# Patient Record
Sex: MALE | Race: BLACK OR AFRICAN AMERICAN | NOT HISPANIC OR LATINO | Employment: OTHER | URBAN - METROPOLITAN AREA
[De-identification: names, ages, dates, MRNs, and addresses within clinical notes are randomized per-mention and may not be internally consistent; named-entity substitution may affect disease eponyms.]

---

## 2023-05-08 ENCOUNTER — LAB VISIT (OUTPATIENT)
Dept: LAB | Facility: HOSPITAL | Age: 73
End: 2023-05-08
Attending: PHYSICAL MEDICINE & REHABILITATION
Payer: OTHER GOVERNMENT

## 2023-05-08 DIAGNOSIS — E11.9 DIABETES MELLITUS WITHOUT COMPLICATION: Primary | ICD-10-CM

## 2023-05-08 LAB
ALBUMIN SERPL-MCNC: 3.9 G/DL (ref 3.4–4.8)
ALBUMIN/GLOB SERPL: 1.1 RATIO (ref 1.1–2)
ALP SERPL-CCNC: 65 UNIT/L (ref 40–150)
ALT SERPL-CCNC: 13 UNIT/L (ref 0–55)
APPEARANCE UR: CLEAR
AST SERPL-CCNC: 20 UNIT/L (ref 5–34)
BACTERIA #/AREA URNS AUTO: NORMAL /HPF
BILIRUB UR QL STRIP.AUTO: NEGATIVE MG/DL
BILIRUBIN DIRECT+TOT PNL SERPL-MCNC: 0.8 MG/DL
BUN SERPL-MCNC: 14 MG/DL (ref 8.4–25.7)
CALCIUM SERPL-MCNC: 9.7 MG/DL (ref 8.8–10)
CHLORIDE SERPL-SCNC: 107 MMOL/L (ref 98–107)
CO2 SERPL-SCNC: 28 MMOL/L (ref 23–31)
COLOR UR AUTO: YELLOW
CREAT SERPL-MCNC: 1.31 MG/DL (ref 0.73–1.18)
GFR SERPLBLD CREATININE-BSD FMLA CKD-EPI: 57 MLS/MIN/1.73/M2
GLOBULIN SER-MCNC: 3.6 GM/DL (ref 2.4–3.5)
GLUCOSE SERPL-MCNC: 78 MG/DL (ref 82–115)
GLUCOSE UR QL STRIP.AUTO: NEGATIVE MG/DL
KETONES UR QL STRIP.AUTO: ABNORMAL MG/DL
LEUKOCYTE ESTERASE UR QL STRIP.AUTO: ABNORMAL UNIT/L
NITRITE UR QL STRIP.AUTO: NEGATIVE
PH UR STRIP.AUTO: 6.5 [PH]
POTASSIUM SERPL-SCNC: 4.3 MMOL/L (ref 3.5–5.1)
PROT SERPL-MCNC: 7.5 GM/DL (ref 5.8–7.6)
PROT UR QL STRIP.AUTO: ABNORMAL MG/DL
RBC #/AREA URNS AUTO: <5 /HPF
RBC UR QL AUTO: NEGATIVE UNIT/L
SODIUM SERPL-SCNC: 142 MMOL/L (ref 136–145)
SP GR UR STRIP.AUTO: 1.02 (ref 1–1.03)
SQUAMOUS #/AREA URNS AUTO: <5 /HPF
UROBILINOGEN UR STRIP-ACNC: 1 MG/DL
WBC #/AREA URNS AUTO: <5 /HPF

## 2023-05-08 PROCEDURE — 81001 URINALYSIS AUTO W/SCOPE: CPT

## 2023-05-08 PROCEDURE — 80053 COMPREHEN METABOLIC PANEL: CPT

## 2023-05-08 PROCEDURE — 36415 COLL VENOUS BLD VENIPUNCTURE: CPT

## 2024-05-09 PROBLEM — E87.70 VOLUME OVERLOAD: Status: ACTIVE | Noted: 2024-05-09

## 2024-05-09 PROBLEM — J84.10 PULMONARY FIBROSIS: Status: ACTIVE | Noted: 2024-05-09

## 2024-05-09 PROBLEM — I50.20 HEART FAILURE WITH REDUCED EJECTION FRACTION: Status: ACTIVE | Noted: 2024-05-09

## 2024-05-20 ENCOUNTER — HOSPITAL ENCOUNTER (OUTPATIENT)
Dept: RADIOLOGY | Facility: HOSPITAL | Age: 74
Discharge: HOME | End: 2024-05-20
Attending: INTERNAL MEDICINE
Payer: MEDICARE

## 2024-05-20 DIAGNOSIS — J84.10 PULMONARY FIBROSIS: ICD-10-CM

## 2024-05-20 PROCEDURE — 71250 CT THORAX DX C-: CPT | Mod: TC

## 2024-07-03 DIAGNOSIS — I35.0 AORTIC STENOSIS: Primary | ICD-10-CM

## 2024-07-05 ENCOUNTER — HOSPITAL ENCOUNTER (OUTPATIENT)
Dept: RADIOLOGY | Facility: HOSPITAL | Age: 74
Discharge: HOME | End: 2024-07-05
Attending: INTERNAL MEDICINE
Payer: OTHER GOVERNMENT

## 2024-07-05 DIAGNOSIS — I35.0 AORTIC STENOSIS: ICD-10-CM

## 2024-07-08 ENCOUNTER — HOSPITAL ENCOUNTER (OUTPATIENT)
Dept: RADIOLOGY | Facility: HOSPITAL | Age: 74
Discharge: HOME | End: 2024-07-08
Attending: INTERNAL MEDICINE
Payer: MEDICARE

## 2024-07-08 LAB
CREAT SERPL-MCNC: 1.7 MG/DL (ref 0.5–1.4)
SAMPLE: ABNORMAL

## 2024-07-08 PROCEDURE — 74174 CTA ABD&PLVS W/CONTRAST: CPT | Mod: TC

## 2024-07-08 PROCEDURE — 71275 CT ANGIOGRAPHY CHEST: CPT | Mod: TC

## 2024-07-08 PROCEDURE — 25500020 PHARM REV CODE 255: Performed by: INTERNAL MEDICINE

## 2024-07-08 RX ADMIN — IOHEXOL 100 ML: 350 INJECTION, SOLUTION INTRAVENOUS at 10:07

## 2024-07-10 DIAGNOSIS — I35.1 AORTIC VALVE REGURGITATION: Primary | ICD-10-CM

## 2024-07-16 ENCOUNTER — HOSPITAL ENCOUNTER (OUTPATIENT)
Dept: RADIOLOGY | Facility: HOSPITAL | Age: 74
Discharge: HOME | End: 2024-07-16
Attending: INTERNAL MEDICINE
Payer: OTHER GOVERNMENT

## 2024-07-16 DIAGNOSIS — I35.0 AORTIC STENOSIS: ICD-10-CM

## 2024-07-16 LAB
CREAT SERPL-MCNC: 1.6 MG/DL (ref 0.5–1.4)
SAMPLE: ABNORMAL

## 2024-07-16 PROCEDURE — 25500020 PHARM REV CODE 255: Performed by: INTERNAL MEDICINE

## 2024-07-16 PROCEDURE — 71275 CT ANGIOGRAPHY CHEST: CPT | Mod: TC

## 2024-07-16 RX ADMIN — IOHEXOL 100 ML: 350 INJECTION, SOLUTION INTRAVENOUS at 09:07

## 2024-07-17 ENCOUNTER — HOSPITAL ENCOUNTER (INPATIENT)
Facility: HOSPITAL | Age: 74
LOS: 8 days | Discharge: HOME | DRG: 266 | End: 2024-07-25
Attending: EMERGENCY MEDICINE | Admitting: INTERNAL MEDICINE
Payer: OTHER GOVERNMENT

## 2024-07-17 DIAGNOSIS — R07.9 CHEST PAIN: ICD-10-CM

## 2024-07-17 DIAGNOSIS — R06.02 SHORTNESS OF BREATH: ICD-10-CM

## 2024-07-17 DIAGNOSIS — I48.92 ATRIAL FLUTTER: ICD-10-CM

## 2024-07-17 DIAGNOSIS — I21.4 NSTEMI (NON-ST ELEVATION MYOCARDIAL INFARCTION): ICD-10-CM

## 2024-07-17 DIAGNOSIS — I35.0 AORTIC VALVE STENOSIS, ETIOLOGY OF CARDIAC VALVE DISEASE UNSPECIFIED: ICD-10-CM

## 2024-07-17 DIAGNOSIS — I48.91 A-FIB: ICD-10-CM

## 2024-07-17 DIAGNOSIS — I48.91 ATRIAL FIBRILLATION WITH RVR: ICD-10-CM

## 2024-07-17 DIAGNOSIS — I35.0 AORTIC STENOSIS: ICD-10-CM

## 2024-07-17 DIAGNOSIS — Z95.2 S/P TAVR (TRANSCATHETER AORTIC VALVE REPLACEMENT): Primary | ICD-10-CM

## 2024-07-17 DIAGNOSIS — I49.3 PVC (PREMATURE VENTRICULAR CONTRACTION): ICD-10-CM

## 2024-07-17 LAB
ALBUMIN SERPL-MCNC: 3.4 G/DL (ref 3.4–4.8)
ALBUMIN/GLOB SERPL: 0.9 RATIO (ref 1.1–2)
ALP SERPL-CCNC: 66 UNIT/L (ref 40–150)
ALT SERPL-CCNC: 22 UNIT/L (ref 0–55)
ANION GAP SERPL CALC-SCNC: 12 MEQ/L
APTT PPP: 27.6 SECONDS (ref 23.2–33.7)
AST SERPL-CCNC: 24 UNIT/L (ref 5–34)
BASOPHILS # BLD AUTO: 0.07 X10(3)/MCL
BASOPHILS NFR BLD AUTO: 0.6 %
BILIRUB SERPL-MCNC: 0.5 MG/DL
BNP BLD-MCNC: 601.9 PG/ML
BUN SERPL-MCNC: 20.3 MG/DL (ref 8.4–25.7)
CALCIUM SERPL-MCNC: 9.2 MG/DL (ref 8.8–10)
CHLORIDE SERPL-SCNC: 105 MMOL/L (ref 98–107)
CO2 SERPL-SCNC: 21 MMOL/L (ref 23–31)
CREAT SERPL-MCNC: 1.59 MG/DL (ref 0.73–1.18)
CREAT/UREA NIT SERPL: 13
EOSINOPHIL # BLD AUTO: 0.15 X10(3)/MCL (ref 0–0.9)
EOSINOPHIL NFR BLD AUTO: 1.2 %
ERYTHROCYTE [DISTWIDTH] IN BLOOD BY AUTOMATED COUNT: 15.9 % (ref 11.5–17)
GFR SERPLBLD CREATININE-BSD FMLA CKD-EPI: 45 ML/MIN/1.73/M2
GLOBULIN SER-MCNC: 4 GM/DL (ref 2.4–3.5)
GLUCOSE SERPL-MCNC: 99 MG/DL (ref 82–115)
HCT VFR BLD AUTO: 51.8 % (ref 42–52)
HGB BLD-MCNC: 15.7 G/DL (ref 14–18)
IMM GRANULOCYTES # BLD AUTO: 0.1 X10(3)/MCL (ref 0–0.04)
IMM GRANULOCYTES NFR BLD AUTO: 0.8 %
INR PPP: 1
LYMPHOCYTES # BLD AUTO: 2.56 X10(3)/MCL (ref 0.6–4.6)
LYMPHOCYTES NFR BLD AUTO: 21.2 %
MAGNESIUM SERPL-MCNC: 2.1 MG/DL (ref 1.6–2.6)
MCH RBC QN AUTO: 26 PG (ref 27–31)
MCHC RBC AUTO-ENTMCNC: 30.3 G/DL (ref 33–36)
MCV RBC AUTO: 85.6 FL (ref 80–94)
MONOCYTES # BLD AUTO: 1.46 X10(3)/MCL (ref 0.1–1.3)
MONOCYTES NFR BLD AUTO: 12.1 %
NEUTROPHILS # BLD AUTO: 7.72 X10(3)/MCL (ref 2.1–9.2)
NEUTROPHILS NFR BLD AUTO: 64.1 %
NRBC BLD AUTO-RTO: 0 %
PLATELET # BLD AUTO: 353 X10(3)/MCL (ref 130–400)
PMV BLD AUTO: 10 FL (ref 7.4–10.4)
POTASSIUM SERPL-SCNC: 4 MMOL/L (ref 3.5–5.1)
PROT SERPL-MCNC: 7.4 GM/DL (ref 5.8–7.6)
PROTHROMBIN TIME: 13.1 SECONDS (ref 12.5–14.5)
RBC # BLD AUTO: 6.05 X10(6)/MCL (ref 4.7–6.1)
SODIUM SERPL-SCNC: 138 MMOL/L (ref 136–145)
TROPONIN I SERPL-MCNC: 1.83 NG/ML (ref 0–0.04)
TROPONIN I SERPL-MCNC: 2.56 NG/ML (ref 0–0.04)
WBC # BLD AUTO: 12.06 X10(3)/MCL (ref 4.5–11.5)

## 2024-07-17 PROCEDURE — 63600175 PHARM REV CODE 636 W HCPCS

## 2024-07-17 PROCEDURE — 93005 ELECTROCARDIOGRAM TRACING: CPT

## 2024-07-17 PROCEDURE — 84484 ASSAY OF TROPONIN QUANT: CPT | Performed by: NURSE PRACTITIONER

## 2024-07-17 PROCEDURE — 85025 COMPLETE CBC W/AUTO DIFF WBC: CPT | Performed by: NURSE PRACTITIONER

## 2024-07-17 PROCEDURE — 63600175 PHARM REV CODE 636 W HCPCS: Performed by: EMERGENCY MEDICINE

## 2024-07-17 PROCEDURE — 85730 THROMBOPLASTIN TIME PARTIAL: CPT | Performed by: NURSE PRACTITIONER

## 2024-07-17 PROCEDURE — 99291 CRITICAL CARE FIRST HOUR: CPT | Mod: 25

## 2024-07-17 PROCEDURE — 25000003 PHARM REV CODE 250

## 2024-07-17 PROCEDURE — 25000003 PHARM REV CODE 250: Performed by: EMERGENCY MEDICINE

## 2024-07-17 PROCEDURE — 80053 COMPREHEN METABOLIC PANEL: CPT | Performed by: NURSE PRACTITIONER

## 2024-07-17 PROCEDURE — 83880 ASSAY OF NATRIURETIC PEPTIDE: CPT | Performed by: NURSE PRACTITIONER

## 2024-07-17 PROCEDURE — 93010 ELECTROCARDIOGRAM REPORT: CPT | Mod: ,,, | Performed by: INTERNAL MEDICINE

## 2024-07-17 PROCEDURE — 84484 ASSAY OF TROPONIN QUANT: CPT | Performed by: EMERGENCY MEDICINE

## 2024-07-17 PROCEDURE — 96374 THER/PROPH/DIAG INJ IV PUSH: CPT

## 2024-07-17 PROCEDURE — 21400001 HC TELEMETRY ROOM

## 2024-07-17 PROCEDURE — 11000001 HC ACUTE MED/SURG PRIVATE ROOM

## 2024-07-17 PROCEDURE — 96372 THER/PROPH/DIAG INJ SC/IM: CPT

## 2024-07-17 PROCEDURE — 85610 PROTHROMBIN TIME: CPT | Performed by: NURSE PRACTITIONER

## 2024-07-17 PROCEDURE — 83735 ASSAY OF MAGNESIUM: CPT | Performed by: EMERGENCY MEDICINE

## 2024-07-17 RX ORDER — DIGOXIN 0.25 MG/ML
500 INJECTION INTRAMUSCULAR; INTRAVENOUS
Status: COMPLETED | OUTPATIENT
Start: 2024-07-17 | End: 2024-07-17

## 2024-07-17 RX ORDER — METOPROLOL TARTRATE 25 MG/1
25 TABLET, FILM COATED ORAL 2 TIMES DAILY
Status: DISCONTINUED | OUTPATIENT
Start: 2024-07-17 | End: 2024-07-18

## 2024-07-17 RX ORDER — ASPIRIN 81 MG/1
243 TABLET ORAL
Status: COMPLETED | OUTPATIENT
Start: 2024-07-17 | End: 2024-07-17

## 2024-07-17 RX ORDER — SODIUM CHLORIDE 0.9 % (FLUSH) 0.9 %
10 SYRINGE (ML) INJECTION
Status: DISCONTINUED | OUTPATIENT
Start: 2024-07-17 | End: 2024-07-25 | Stop reason: HOSPADM

## 2024-07-17 RX ORDER — METOPROLOL TARTRATE 1 MG/ML
5 INJECTION, SOLUTION INTRAVENOUS EVERY 5 MIN PRN
Status: COMPLETED | OUTPATIENT
Start: 2024-07-17 | End: 2024-07-22

## 2024-07-17 RX ORDER — TALC
6 POWDER (GRAM) TOPICAL NIGHTLY PRN
Status: DISCONTINUED | OUTPATIENT
Start: 2024-07-17 | End: 2024-07-25 | Stop reason: HOSPADM

## 2024-07-17 RX ORDER — ENOXAPARIN SODIUM 100 MG/ML
1 INJECTION SUBCUTANEOUS EVERY 12 HOURS
Status: DISCONTINUED | OUTPATIENT
Start: 2024-07-17 | End: 2024-07-18

## 2024-07-17 RX ADMIN — ENOXAPARIN SODIUM 90 MG: 100 INJECTION SUBCUTANEOUS at 05:07

## 2024-07-17 RX ADMIN — METOPROLOL TARTRATE 5 MG: 1 INJECTION, SOLUTION INTRAVENOUS at 04:07

## 2024-07-17 RX ADMIN — ASPIRIN 243 MG: 81 TABLET, COATED ORAL at 05:07

## 2024-07-17 RX ADMIN — METOPROLOL TARTRATE 25 MG: 25 TABLET, FILM COATED ORAL at 09:07

## 2024-07-17 RX ADMIN — DIGOXIN 500 MCG: 0.25 INJECTION INTRAMUSCULAR; INTRAVENOUS at 06:07

## 2024-07-17 NOTE — Clinical Note
The PA catheter was inserted into the right atrium. Hemodynamics were performed. repositioned to RV, PA, and PCW

## 2024-07-17 NOTE — Clinical Note
The catheter was inserted in the right ventricle. The transvenous pacing lead was inserted into the RV and was placed and capture was confirmed. The pacer was paced at 60 BPM 10 mA 0.5 mV.

## 2024-07-17 NOTE — Clinical Note
Diagnosis: Atrial fibrillation with RVR [[id number]]   Future Attending Provider: KAY SCHULTE [[id number]]   Admit to which facility:: [facility] [[id number]]   Reason for IP Medical Treatment  (Clinical interventions that can only be accomplished in the IP setting? ) :: Rate/rhythm control, CV surgery evaluation   I certify that Inpatient services for greater than or equal to 2 midnights are medically necessary:: Yes   Plans for Post-Acute care--if anticipated (pick the single best option):: A. No post acute care anticipated at this time

## 2024-07-17 NOTE — Clinical Note
The PA catheter was inserted into the pulmonary wedge and secured in place for continuous hemodynamic monitoring. Hemodynamics were performed.

## 2024-07-17 NOTE — Clinical Note
dry, intact, no bleeding and no hematoma. Right groin covered with dermabond. Tegaderm applied to left groin.

## 2024-07-17 NOTE — FIRST PROVIDER EVALUATION
Medical screening examination initiated.  I have conducted a focused provider triage encounter, findings are as follows:    Brief history of present illness:  Patient states SOB and chest pain. States that he was sent to the ED for new onset Afib.     There were no vitals filed for this visit.    Pertinent physical exam:  Awake, alert, ambulatory    Brief workup plan:  Labs, EKG    Preliminary workup initiated; this workup will be continued and followed by the physician or advanced practice provider that is assigned to the patient when roomed.

## 2024-07-17 NOTE — ED NOTES
Spoke with CV surgery NP aware of consult will see in am. Will keep NPO tonight for possible other test.

## 2024-07-17 NOTE — ED PROVIDER NOTES
Encounter Date: 7/17/2024    SCRIBE #1 NOTE: I, Loida Montoya, am scribing for, and in the presence of,  Sasha Gonzalez MD. I have scribed the following portions of the note - the EKG reading. Other sections scribed: HPI, ROS, PE.       History     Chief Complaint   Patient presents with    Palpitations     Sent by [facility] for new onset afib. Had angiogram on 7/10. C/o SOB on exertion. Denies chest pain, only complains of palpitations.      The patient is a 73 year old male with DM Type 2, CKD, HTN, HLD, Dilated cardiomyopathy, and prostate cancer presenting to the ED due palpitations onset the night of 07/16. The patient was sent from [facility] due to new Afib RVR on 07/17. The patient complains of coughing, congestion, and a new onset of fatigue states that he has been feeling more tired than normal. The patient denies chest pain, SOB, and pain. The patient reports to have an angiogram on 07/10. He reports to have surgery on 07/22.     Vascular Surgeon: Rodrigo Peres MD  [facility] Physician: Darin Rahman MD          The history is provided by the patient and medical records. No  was used.   Palpitations   This is a new problem. The current episode started yesterday. Associated symptoms include malaise/fatigue and cough. Pertinent negatives include no chest pain and no shortness of breath.     Review of patient's allergies indicates:   Allergen Reactions    Iodinated contrast media Swelling     Swelling of hands, feet and glands    Atorvastatin      Other reaction(s): Other (See Comments), Unknown    Contrast media     Iodine      Other reaction(s): Other (See Comments)    Lovastatin      Other reaction(s): Other (See Comments), Unknown    Simvastatin      Other reaction(s): Other (See Comments), Unknown     Past Medical History:   Diagnosis Date    Chronic kidney disease, unspecified     Dilated cardiomyopathy     Essential (primary) hypertension     GERD (gastroesophageal reflux disease)      Mixed hyperlipidemia     KATELYN (obstructive sleep apnea)     Prostate cancer     Type 2 diabetes mellitus without complications      No past surgical history on file.  No family history on file.  Social History     Tobacco Use    Smoking status: Never    Smokeless tobacco: Never     Review of Systems   Constitutional:  Positive for fatigue and malaise/fatigue.   HENT:  Positive for congestion.    Respiratory:  Positive for cough. Negative for shortness of breath.    Cardiovascular:  Positive for palpitations. Negative for chest pain.       Physical Exam     Initial Vitals [07/17/24 1540]   BP Pulse Resp Temp SpO2   108/72 (!) 118 20 97.6 °F (36.4 °C) 97 %      MAP       --         Physical Exam    Nursing note and vitals reviewed.  Constitutional: No distress.   HENT:   Head: Normocephalic and atraumatic.   Mouth/Throat: Oropharynx is clear and moist.   Eyes: Conjunctivae are normal.   Neck: Neck supple.   Normal range of motion.  Cardiovascular:  An irregularly irregular rhythm present.   Tachycardia present.         No murmur heard.  Pulmonary/Chest: Breath sounds normal. No respiratory distress. He exhibits no tenderness.   Abdominal: Abdomen is soft. Bowel sounds are normal. He exhibits no distension. There is no abdominal tenderness.   Musculoskeletal:         General: Normal range of motion.      Cervical back: Normal range of motion and neck supple.      Lumbar back: Normal. No tenderness. Normal range of motion.     Neurological: He is alert and oriented to person, place, and time. He has normal strength. No cranial nerve deficit or sensory deficit.   Skin: Skin is warm and dry.   Psychiatric: He has a normal mood and affect. His behavior is normal. Thought content normal.         ED Course   Critical Care    Date/Time: 7/17/2024 4:22 PM    Performed by: Sasha Gonzalez MD  Authorized by: Sasha Gonzalez MD  Direct patient critical care time: 21 minutes  Additional history critical care time: 6  minutes  Ordering / reviewing critical care time: 8 minutes  Documentation critical care time: 4 minutes  Consulting other physicians critical care time: 5 minutes  Total critical care time (exclusive of procedural time) : 44 minutes  Critical care time was exclusive of separately billable procedures and treating other patients.  Critical care was necessary to treat or prevent imminent or life-threatening deterioration of the following conditions: circulatory failure and cardiac failure.  Critical care was time spent personally by me on the following activities: development of treatment plan with patient or surrogate, discussions with consultants, interpretation of cardiac output measurements, evaluation of patient's response to treatment, examination of patient, obtaining history from patient or surrogate, ordering and performing treatments and interventions, ordering and review of laboratory studies, ordering and review of radiographic studies, pulse oximetry, re-evaluation of patient's condition and review of old charts.        Labs Reviewed   COMPREHENSIVE METABOLIC PANEL - Abnormal; Notable for the following components:       Result Value    CO2 21 (*)     Creatinine 1.59 (*)     Globulin 4.0 (*)     Albumin/Globulin Ratio 0.9 (*)     All other components within normal limits   B-TYPE NATRIURETIC PEPTIDE - Abnormal; Notable for the following components:    Natriuretic Peptide 601.9 (*)     All other components within normal limits   TROPONIN I - Abnormal; Notable for the following components:    Troponin-I 2.561 (*)     All other components within normal limits   CBC WITH DIFFERENTIAL - Abnormal; Notable for the following components:    WBC 12.06 (*)     MCH 26.0 (*)     MCHC 30.3 (*)     Mono # 1.46 (*)     IG# 0.10 (*)     All other components within normal limits   APTT - Normal   PROTIME-INR - Normal   MAGNESIUM - Normal   CBC W/ AUTO DIFFERENTIAL    Narrative:     The following orders were created for  panel order CBC Auto Differential.  Procedure                               Abnormality         Status                     ---------                               -----------         ------                     CBC with Differential[[id number]]       Abnormal            Final result                 Please view results for these tests on the individual orders.   TROPONIN I     EKG Readings: (Independently Interpreted)   Initial Reading: No STEMI. Rhythm: Sinus Tachycardia. Heart Rate: 143. Ectopy: No Ectopy. Conduction: Normal. ST Segments: Normal ST Segments. T Waves: Normal. Axis: Left Axis Deviation. Clinical Impression: Atrial Fibrillation with RVR   07/18/2024 @ 1543.          Imaging Results              X-Ray Chest 1 View (Final result)  Result time 07/17/24 16:14:00      Final result by Evin Solano MD (07/17/24 16:14:00)                   Impression:      No acute findings.      Electronically signed by: Evin Solano  Date:    07/17/2024  Time:    16:14               Narrative:    EXAMINATION:  XR CHEST 1 VIEW    CLINICAL HISTORY:  Shortness of breath    COMPARISON:  No priors    FINDINGS:  Frontal view of the chest was obtained. Median sternotomy.  Heart is mildly enlarged.  Lungs are grossly clear.  There is no pneumothorax.                                       Medications   metoprolol injection 5 mg (5 mg Intravenous Given 7/17/24 1622)   enoxaparin injection 90 mg (90 mg Subcutaneous Given 7/17/24 1700)   metoprolol tartrate (LOPRESSOR) tablet 25 mg (has no administration in time range)   sodium chloride 0.9% flush 10 mL (has no administration in time range)   melatonin tablet 6 mg (has no administration in time range)   aspirin EC tablet 243 mg (243 mg Oral Given 7/17/24 1749)   digoxin injection 500 mcg (500 mcg Intravenous Given 7/17/24 1800)     Medical Decision Making  Problems Addressed:  Aortic valve stenosis, etiology of cardiac valve disease unspecified: chronic illness or injury with  exacerbation, progression, or side effects of treatment  Atrial fibrillation with RVR: acute illness or injury that poses a threat to life or bodily functions  NSTEMI (non-ST elevation myocardial infarction): acute illness or injury that poses a threat to life or bodily functions  Shortness of breath: acute illness or injury    Amount and/or Complexity of Data Reviewed  Labs: ordered.  Radiology: ordered.  ECG/medicine tests: ordered and independent interpretation performed.     Details: Initial Reading: No STEMI. Rhythm: Sinus Tachycardia. Heart Rate: 143. Ectopy: No Ectopy. Conduction: Normal. ST Segments: Normal ST Segments. T Waves: Normal. Axis: Left Axis Deviation. Clinical Impression: Atrial Fibrillation with RVR   Peformed at 1543.     Risk  OTC drugs.  Prescription drug management.  Decision regarding hospitalization.        ED assessment:    Mr. Oakley presented for evaluation of new onset AF RVR in setting of recent [facility] w/ intervention, pending AV replacement. + palpitations and some shortness of breath with supine positioning. In no distress.     Differential diagnosis (including but not limited to):   Arrhythmia, electrolyte derangements, CAD, ACS, valvular heart disease    ED management:   EKG w/ AF RVR, reported recent EF 20%. Started on IV metoprolol; however, BP did not tolerate. Given digoxin load w/ improved rate control and BP stabilizing as well.   Labs w/ no significant electrolyte derangements; however, + elevated troponin.   Given full dose aspirin, lovenox per cardiology and admitted to cardiology service.     My independent radiology interpretation:   CXR: no focal infiltrate or effusion    Amount and/or Complexity of Data Reviewed  Independent historian: none   Summary of history:   External data reviewed: notes from clinic visits, prior labs, and prior imaging  Summary of data reviewed:   Clinic note from today reviewed.  Severe aortic stenosis with EF 4 months ago of 45%, now 20%.   History of coronary artery bypass graft.  Status post left heart catheterization 07/10/2024 status post balloon angioplasty and REAGAN left main and lithotripsy.  Presented today with new onset atrial fibrillation with rapid ventricular response.    Risk and benefits of testing: discussed   Labs: ordered and reviewed  Radiology: ordered and independent interpretation performed (see above or ED course)  ECG/medicine tests: ordered and independent interpretation performed (see above or ED course)  Discussion of management or test interpretation with external provider(s): discussed with cardiology consultant   Summary of discussion: as below    Risk  Prescription drug management   Decision regarding hospitalization  Shared decision making     Critical Care  30-74 minutes     Sasha MOYER MD personally performed the history, PE, MDM, and procedures as documented above and agree with the scribe's documentation.                 Scribe Attestation:   Scribe #1: I performed the above scribed service and the documentation accurately describes the services I performed. I attest to the accuracy of the note.    Attending Attestation:           Physician Attestation for Scribe:  Physician Attestation Statement for Scribe #1: Carlos MOYER Kayla O, MD, reviewed documentation, as scribed by Loida Montoya in my presence, and it is both accurate and complete.             ED Course as of 07/17/24 1842   Wed Jul 17, 2024   1554 Discussed with [facility] NP - advised most recent echocardiogram w/ EF 20%.     Will start on metoprolol pushes. Digoxin if unable to rate control.   Supposed to be having TAVR on Monday. [KS]   1804 Discussed with [facility], accepts for admission.  Agrees with digoxin given low BP and response to metoprolol.  Patient has already received Lovenox ordered earlier by [facility].  Requests keep NPO pending Cardiology evaluation in the morning. [KS]      ED Course User Index  [KS] Sasha Gonzalez MD                            Clinical Impression:  Final diagnoses:  [R06.02] Shortness of breath  [I48.91] Atrial fibrillation with RVR  [I21.4] NSTEMI (non-ST elevation myocardial infarction)  [I35.0] Aortic valve stenosis, etiology of cardiac valve disease unspecified          ED Disposition Condition    Admit                 Sasha Gonzalez MD  07/18/24 8836

## 2024-07-17 NOTE — Clinical Note
The catheter was inserted into the aorta. Hemodynamics were performed.  An angiography was performed of the aorta. The angiography was performed via power injection.

## 2024-07-17 NOTE — Clinical Note
The catheter was inserted into the left ventricle. Used stiff short straight glidewire to cross aortic valve, exchanged for 0.035x260 safari wire

## 2024-07-18 LAB
ABORH RETYPE: NORMAL
ALBUMIN SERPL-MCNC: 3.4 G/DL (ref 3.4–4.8)
ALBUMIN/GLOB SERPL: 1 RATIO (ref 1.1–2)
ALP SERPL-CCNC: 71 UNIT/L (ref 40–150)
ALT SERPL-CCNC: 22 UNIT/L (ref 0–55)
ANION GAP SERPL CALC-SCNC: 11 MEQ/L
AST SERPL-CCNC: 22 UNIT/L (ref 5–34)
BASOPHILS # BLD AUTO: 0.08 X10(3)/MCL
BASOPHILS NFR BLD AUTO: 0.8 %
BILIRUB SERPL-MCNC: 1 MG/DL
BUN SERPL-MCNC: 20.9 MG/DL (ref 8.4–25.7)
CALCIUM SERPL-MCNC: 9.6 MG/DL (ref 8.8–10)
CHLORIDE SERPL-SCNC: 104 MMOL/L (ref 98–107)
CO2 SERPL-SCNC: 26 MMOL/L (ref 23–31)
CREAT SERPL-MCNC: 1.51 MG/DL (ref 0.73–1.18)
CREAT/UREA NIT SERPL: 14
EOSINOPHIL # BLD AUTO: 0.22 X10(3)/MCL (ref 0–0.9)
EOSINOPHIL NFR BLD AUTO: 2.3 %
ERYTHROCYTE [DISTWIDTH] IN BLOOD BY AUTOMATED COUNT: 15.9 % (ref 11.5–17)
GFR SERPLBLD CREATININE-BSD FMLA CKD-EPI: 48 ML/MIN/1.73/M2
GLOBULIN SER-MCNC: 3.5 GM/DL (ref 2.4–3.5)
GLUCOSE SERPL-MCNC: 93 MG/DL (ref 82–115)
GROUP & RH: NORMAL
HCT VFR BLD AUTO: 49.9 % (ref 42–52)
HGB BLD-MCNC: 15.2 G/DL (ref 14–18)
IMM GRANULOCYTES # BLD AUTO: 0.07 X10(3)/MCL (ref 0–0.04)
IMM GRANULOCYTES NFR BLD AUTO: 0.7 %
INDIRECT COOMBS: NORMAL
LYMPHOCYTES # BLD AUTO: 1.95 X10(3)/MCL (ref 0.6–4.6)
LYMPHOCYTES NFR BLD AUTO: 20.5 %
MAGNESIUM SERPL-MCNC: 2.3 MG/DL (ref 1.6–2.6)
MAGNESIUM SERPL-MCNC: 2.3 MG/DL (ref 1.6–2.6)
MCH RBC QN AUTO: 26 PG (ref 27–31)
MCHC RBC AUTO-ENTMCNC: 30.5 G/DL (ref 33–36)
MCV RBC AUTO: 85.4 FL (ref 80–94)
MONOCYTES # BLD AUTO: 1.2 X10(3)/MCL (ref 0.1–1.3)
MONOCYTES NFR BLD AUTO: 12.6 %
NEUTROPHILS # BLD AUTO: 6.01 X10(3)/MCL (ref 2.1–9.2)
NEUTROPHILS NFR BLD AUTO: 63.1 %
NRBC BLD AUTO-RTO: 0 %
OHS QRS DURATION: 120 MS
OHS QTC CALCULATION: 574 MS
PLATELET # BLD AUTO: 361 X10(3)/MCL (ref 130–400)
PMV BLD AUTO: 9.9 FL (ref 7.4–10.4)
POTASSIUM SERPL-SCNC: 4.8 MMOL/L (ref 3.5–5.1)
POTASSIUM SERPL-SCNC: 5.2 MMOL/L (ref 3.5–5.1)
PROT SERPL-MCNC: 6.9 GM/DL (ref 5.8–7.6)
RBC # BLD AUTO: 5.84 X10(6)/MCL (ref 4.7–6.1)
SODIUM SERPL-SCNC: 141 MMOL/L (ref 136–145)
SPECIMEN OUTDATE: NORMAL
T4 FREE SERPL-MCNC: 0.86 NG/DL (ref 0.7–1.48)
TROPONIN I SERPL-MCNC: 2.36 NG/ML (ref 0–0.04)
TROPONIN I SERPL-MCNC: 2.75 NG/ML (ref 0–0.04)
TROPONIN I SERPL-MCNC: 3.04 NG/ML (ref 0–0.04)
TSH SERPL-ACNC: 5.93 UIU/ML (ref 0.35–4.94)
WBC # BLD AUTO: 9.53 X10(3)/MCL (ref 4.5–11.5)

## 2024-07-18 PROCEDURE — 93005 ELECTROCARDIOGRAM TRACING: CPT

## 2024-07-18 PROCEDURE — 84132 ASSAY OF SERUM POTASSIUM: CPT | Performed by: NURSE PRACTITIONER

## 2024-07-18 PROCEDURE — 93010 ELECTROCARDIOGRAM REPORT: CPT | Mod: ,,, | Performed by: INTERNAL MEDICINE

## 2024-07-18 PROCEDURE — 25000003 PHARM REV CODE 250: Performed by: NURSE PRACTITIONER

## 2024-07-18 PROCEDURE — 25000003 PHARM REV CODE 250: Performed by: PHYSICIAN ASSISTANT

## 2024-07-18 PROCEDURE — 86901 BLOOD TYPING SEROLOGIC RH(D): CPT | Performed by: NURSE PRACTITIONER

## 2024-07-18 PROCEDURE — 86923 COMPATIBILITY TEST ELECTRIC: CPT | Mod: 91 | Performed by: NURSE PRACTITIONER

## 2024-07-18 PROCEDURE — 36415 COLL VENOUS BLD VENIPUNCTURE: CPT

## 2024-07-18 PROCEDURE — 21400001 HC TELEMETRY ROOM

## 2024-07-18 PROCEDURE — 84484 ASSAY OF TROPONIN QUANT: CPT | Performed by: EMERGENCY MEDICINE

## 2024-07-18 PROCEDURE — 83735 ASSAY OF MAGNESIUM: CPT | Performed by: NURSE PRACTITIONER

## 2024-07-18 PROCEDURE — 83735 ASSAY OF MAGNESIUM: CPT

## 2024-07-18 PROCEDURE — 86900 BLOOD TYPING SEROLOGIC ABO: CPT | Performed by: NURSE PRACTITIONER

## 2024-07-18 PROCEDURE — 63600175 PHARM REV CODE 636 W HCPCS

## 2024-07-18 PROCEDURE — 25000003 PHARM REV CODE 250

## 2024-07-18 PROCEDURE — 84443 ASSAY THYROID STIM HORMONE: CPT | Performed by: NURSE PRACTITIONER

## 2024-07-18 PROCEDURE — 86850 RBC ANTIBODY SCREEN: CPT | Performed by: NURSE PRACTITIONER

## 2024-07-18 PROCEDURE — 84484 ASSAY OF TROPONIN QUANT: CPT | Performed by: NURSE PRACTITIONER

## 2024-07-18 PROCEDURE — 84439 ASSAY OF FREE THYROXINE: CPT | Performed by: NURSE PRACTITIONER

## 2024-07-18 PROCEDURE — 80053 COMPREHEN METABOLIC PANEL: CPT

## 2024-07-18 PROCEDURE — 36415 COLL VENOUS BLD VENIPUNCTURE: CPT | Performed by: NURSE PRACTITIONER

## 2024-07-18 PROCEDURE — 85025 COMPLETE CBC W/AUTO DIFF WBC: CPT

## 2024-07-18 PROCEDURE — 93010 ELECTROCARDIOGRAM REPORT: CPT | Mod: 76,,, | Performed by: INTERNAL MEDICINE

## 2024-07-18 PROCEDURE — 99223 1ST HOSP IP/OBS HIGH 75: CPT | Mod: ,,, | Performed by: PHYSICIAN ASSISTANT

## 2024-07-18 PROCEDURE — 11000001 HC ACUTE MED/SURG PRIVATE ROOM

## 2024-07-18 RX ORDER — GLIPIZIDE 10 MG/1
10 TABLET ORAL DAILY
Status: DISCONTINUED | OUTPATIENT
Start: 2024-07-18 | End: 2024-07-25 | Stop reason: HOSPADM

## 2024-07-18 RX ORDER — HYDROCODONE BITARTRATE AND ACETAMINOPHEN 500; 5 MG/1; MG/1
TABLET ORAL
Status: DISCONTINUED | OUTPATIENT
Start: 2024-07-18 | End: 2024-07-25 | Stop reason: HOSPADM

## 2024-07-18 RX ORDER — FAMOTIDINE 20 MG/1
20 TABLET, FILM COATED ORAL DAILY
Status: DISCONTINUED | OUTPATIENT
Start: 2024-07-18 | End: 2024-07-19

## 2024-07-18 RX ORDER — ATORVASTATIN CALCIUM 40 MG/1
40 TABLET, FILM COATED ORAL DAILY
Status: DISCONTINUED | OUTPATIENT
Start: 2024-07-18 | End: 2024-07-25 | Stop reason: HOSPADM

## 2024-07-18 RX ORDER — AZELASTINE 1 MG/ML
2 SPRAY, METERED NASAL 2 TIMES DAILY
Status: DISCONTINUED | OUTPATIENT
Start: 2024-07-18 | End: 2024-07-25 | Stop reason: HOSPADM

## 2024-07-18 RX ORDER — METOPROLOL TARTRATE 25 MG/1
25 TABLET, FILM COATED ORAL ONCE
Status: DISCONTINUED | OUTPATIENT
Start: 2024-07-18 | End: 2024-07-18

## 2024-07-18 RX ORDER — ALBUTEROL SULFATE 90 UG/1
2 INHALANT RESPIRATORY (INHALATION) EVERY 4 HOURS PRN
Status: DISCONTINUED | OUTPATIENT
Start: 2024-07-18 | End: 2024-07-25 | Stop reason: HOSPADM

## 2024-07-18 RX ORDER — CLOPIDOGREL BISULFATE 75 MG/1
75 TABLET ORAL DAILY
Status: DISCONTINUED | OUTPATIENT
Start: 2024-07-18 | End: 2024-07-25 | Stop reason: HOSPADM

## 2024-07-18 RX ORDER — EZETIMIBE 10 MG/1
10 TABLET ORAL DAILY
Status: DISCONTINUED | OUTPATIENT
Start: 2024-07-18 | End: 2024-07-25 | Stop reason: HOSPADM

## 2024-07-18 RX ORDER — METOPROLOL SUCCINATE 25 MG/1
25 TABLET, EXTENDED RELEASE ORAL DAILY
Status: DISCONTINUED | OUTPATIENT
Start: 2024-07-18 | End: 2024-07-18

## 2024-07-18 RX ORDER — CHOLECALCIFEROL (VITAMIN D3) 25 MCG
1000 TABLET ORAL DAILY
Status: DISCONTINUED | OUTPATIENT
Start: 2024-07-18 | End: 2024-07-25 | Stop reason: HOSPADM

## 2024-07-18 RX ORDER — METOPROLOL TARTRATE 50 MG/1
50 TABLET ORAL 2 TIMES DAILY
Status: DISCONTINUED | OUTPATIENT
Start: 2024-07-18 | End: 2024-07-20

## 2024-07-18 RX ORDER — ASPIRIN 81 MG/1
81 TABLET ORAL DAILY
Status: DISCONTINUED | OUTPATIENT
Start: 2024-07-18 | End: 2024-07-25 | Stop reason: HOSPADM

## 2024-07-18 RX ORDER — FUROSEMIDE 20 MG/1
20 TABLET ORAL DAILY
Status: DISCONTINUED | OUTPATIENT
Start: 2024-07-18 | End: 2024-07-25 | Stop reason: HOSPADM

## 2024-07-18 RX ORDER — GUAIFENESIN 600 MG/1
1200 TABLET, EXTENDED RELEASE ORAL 2 TIMES DAILY
Status: DISCONTINUED | OUTPATIENT
Start: 2024-07-18 | End: 2024-07-25 | Stop reason: HOSPADM

## 2024-07-18 RX ADMIN — EZETIMIBE 10 MG: 10 TABLET ORAL at 10:07

## 2024-07-18 RX ADMIN — Medication 1000 UNITS: at 10:07

## 2024-07-18 RX ADMIN — ENOXAPARIN SODIUM 90 MG: 100 INJECTION SUBCUTANEOUS at 04:07

## 2024-07-18 RX ADMIN — FUROSEMIDE 20 MG: 20 TABLET ORAL at 10:07

## 2024-07-18 RX ADMIN — ASPIRIN 81 MG: 81 TABLET, COATED ORAL at 10:07

## 2024-07-18 RX ADMIN — FAMOTIDINE 20 MG: 20 TABLET, FILM COATED ORAL at 10:07

## 2024-07-18 RX ADMIN — ATORVASTATIN CALCIUM 40 MG: 40 TABLET, FILM COATED ORAL at 10:07

## 2024-07-18 RX ADMIN — GUAIFENESIN 1200 MG: 600 TABLET, EXTENDED RELEASE ORAL at 09:07

## 2024-07-18 RX ADMIN — CLOPIDOGREL BISULFATE 75 MG: 75 TABLET ORAL at 10:07

## 2024-07-18 RX ADMIN — GUAIFENESIN 1200 MG: 600 TABLET, EXTENDED RELEASE ORAL at 10:07

## 2024-07-18 RX ADMIN — ENOXAPARIN SODIUM 90 MG: 100 INJECTION SUBCUTANEOUS at 05:07

## 2024-07-18 RX ADMIN — METOPROLOL TARTRATE 50 MG: 50 TABLET, FILM COATED ORAL at 09:07

## 2024-07-18 RX ADMIN — METOPROLOL TARTRATE 5 MG: 1 INJECTION, SOLUTION INTRAVENOUS at 12:07

## 2024-07-18 RX ADMIN — GLIPIZIDE 10 MG: 10 TABLET ORAL at 10:07

## 2024-07-18 RX ADMIN — METOPROLOL TARTRATE 25 MG: 25 TABLET, FILM COATED ORAL at 07:07

## 2024-07-18 NOTE — H&P (VIEW-ONLY)
"CT SURGERY PROGRESS NOTE  Cole Oakley  73 y.o.  1950    Patients Procedure: * No surgery found *    Subjective  Interval History: 74 yo s/p CABG and subsequent PTCA LM known severe AS EF 45% 4 months ago - now decreased to EF 20% recently seen on [facility] clinic with new onset afib     Review of Systems   Constitutional: Negative.    HENT: Negative.     Respiratory:  Positive for shortness of breath.    Cardiovascular:         AS, CAD    Genitourinary: Negative.    Musculoskeletal: Negative.    Neurological: Negative.        Medication List  Infusions    Scheduled   enoxparin  1 mg/kg Subcutaneous Q12H (treatment, non-standard time)    metoprolol tartrate  25 mg Oral BID       Objective:  Recent Vitals:  Temp:  [97.6 °F (36.4 °C)-98.9 °F (37.2 °C)] 98.9 °F (37.2 °C)  Pulse:  [] 115  Resp:  [12-30] 18  SpO2:  [91 %-100 %] 97 %  BP: ()/(62-90) 121/64    Physical Exam  Constitutional:       Appearance: Normal appearance.   HENT:      Head: Normocephalic.   Eyes:      Extraocular Movements: Extraocular movements intact.      Pupils: Pupils are equal, round, and reactive to light.   Cardiovascular:      Rate and Rhythm: Normal rate. Rhythm irregular.      Heart sounds: Murmur heard.   Pulmonary:      Effort: Pulmonary effort is normal.   Abdominal:      General: Bowel sounds are normal.   Musculoskeletal:         General: Normal range of motion.   Skin:     General: Skin is warm.   Neurological:      General: No focal deficit present.      Mental Status: He is alert.   Psychiatric:         Mood and Affect: Mood normal.         Behavior: Behavior normal.          I/O last 24 hrs:  Intake/Output - Last 3 Shifts         07/16 0700  07/17 0659 07/17 0700 07/18 0659 07/18 0700 07/19 0659    P.O.  0     Total Intake(mL/kg)  0 (0)     Net  0            Urine Occurrence  4 x     Stool Occurrence  1 x             Labs  ABGs: No results for input(s): "PH", "PCO2", "PO2", "HCO3", "POCSATURATED", "BE" in the " last 48 hours.  BMP:   Recent Labs   Lab 07/18/24  0406      K 5.2*      CO2 26   BUN 20.9   CREATININE 1.51*   CALCIUM 9.6   MG 2.30     CBC:   Recent Labs   Lab 07/18/24  0406   WBC 9.53   RBC 5.84   HGB 15.2   HCT 49.9      MCV 85.4   MCH 26.0*   MCHC 30.5*     CMP:   Recent Labs   Lab 07/18/24  0406   CALCIUM 9.6   ALBUMIN 3.4      K 5.2*   CO2 26      BUN 20.9   CREATININE 1.51*   ALKPHOS 71   ALT 22   AST 22   BILITOT 1.0     Coagulation:   Recent Labs   Lab 07/17/24  1637   INR 1.0   APTT 27.6         Imaging:   CT: No results found in the last 24 hours.  CXR: X-Ray Chest 1 View    Result Date: 7/17/2024  No acute findings. Electronically signed by: Evin Solano Date:    07/17/2024 Time:    16:14   ECHO: I have reviewed all results within the past 24 hours and my personal findings are:  will review         ASSESSMENT/PLAN:    Severe AS ZULY 0.5cm2  EF 20%   CAD s/p CABG and PTCAs/ Shockwave  HTN  DM2  Afib   Elevated creat 1.5    Plan- ongoing eval per Dr Peres for TAVR - tentatively scheduled for upcoming Monday         Case and plan of care discussed with MD Gilson Bowling PA-C

## 2024-07-18 NOTE — CONSULTS
"CT SURGERY PROGRESS NOTE  Cole Oakley  73 y.o.  1950    Patients Procedure: * No surgery found *    Subjective  Interval History: 72 yo s/p CABG and subsequent PTCA LM known severe AS EF 45% 4 months ago - now decreased to EF 20% recently seen on [facility] with new onset afib     Review of Systems   Constitutional: Negative.    HENT: Negative.     Respiratory:  Positive for shortness of breath.    Cardiovascular:         AS, CAD    Genitourinary: Negative.    Musculoskeletal: Negative.    Neurological: Negative.        Medication List  Infusions    Scheduled   enoxparin  1 mg/kg Subcutaneous Q12H (treatment, non-standard time)    metoprolol tartrate  25 mg Oral BID       Objective:  Recent Vitals:  Temp:  [97.6 °F (36.4 °C)-98.9 °F (37.2 °C)] 98.9 °F (37.2 °C)  Pulse:  [] 115  Resp:  [12-30] 18  SpO2:  [91 %-100 %] 97 %  BP: ()/(62-90) 121/64    Physical Exam  Constitutional:       Appearance: Normal appearance.   HENT:      Head: Normocephalic.   Eyes:      Extraocular Movements: Extraocular movements intact.      Pupils: Pupils are equal, round, and reactive to light.   Cardiovascular:      Rate and Rhythm: Normal rate. Rhythm irregular.      Heart sounds: Murmur heard.   Pulmonary:      Effort: Pulmonary effort is normal.   Abdominal:      General: Bowel sounds are normal.   Musculoskeletal:         General: Normal range of motion.   Skin:     General: Skin is warm.   Neurological:      General: No focal deficit present.      Mental Status: He is alert.   Psychiatric:         Mood and Affect: Mood normal.         Behavior: Behavior normal.          I/O last 24 hrs:  Intake/Output - Last 3 Shifts         07/16 0700  07/17 0659 07/17 0700 07/18 0659 07/18 0700 07/19 0659    P.O.  0     Total Intake(mL/kg)  0 (0)     Net  0            Urine Occurrence  4 x     Stool Occurrence  1 x             Labs  ABGs: No results for input(s): "PH", "PCO2", "PO2", "HCO3", "POCSATURATED", "BE" in the " last 48 hours.  BMP:   Recent Labs   Lab 07/18/24  0406      K 5.2*      CO2 26   BUN 20.9   CREATININE 1.51*   CALCIUM 9.6   MG 2.30     CBC:   Recent Labs   Lab 07/18/24  0406   WBC 9.53   RBC 5.84   HGB 15.2   HCT 49.9      MCV 85.4   MCH 26.0*   MCHC 30.5*     CMP:   Recent Labs   Lab 07/18/24  0406   CALCIUM 9.6   ALBUMIN 3.4      K 5.2*   CO2 26      BUN 20.9   CREATININE 1.51*   ALKPHOS 71   ALT 22   AST 22   BILITOT 1.0     Coagulation:   Recent Labs   Lab 07/17/24  1637   INR 1.0   APTT 27.6         Imaging:   CT: No results found in the last 24 hours.  CXR: X-Ray Chest 1 View    Result Date: 7/17/2024  No acute findings. Electronically signed by: Evin Solano Date:    07/17/2024 Time:    16:14   ECHO: I have reviewed all results within the past 24 hours and my personal findings are:  will review         ASSESSMENT/PLAN:    Severe AS ZULY 0.5cm2  EF 20%   CAD s/p CABG and PTCAs/ Shockwave  HTN  DM2  Afib   Elevated creat 1.5    Plan- ongoing eval per Dr Peres for TAVR - tentatively scheduled for upcoming Monday         Case and plan of care discussed with MD Gilson Bowling PA-C

## 2024-07-18 NOTE — NURSING
Nurses Note -- 4 Eyes      7/18/2024   7:40 AM      Skin assessed during: Q Shift Change      [x] No Altered Skin Integrity Present    [x]Prevention Measures Documented      [] Yes- Altered Skin Integrity Present or Discovered   [] LDA Added if Not in Epic (Describe Wound)   [] New Altered Skin Integrity was Present on Admit and Documented in LDA   [] Wound Image Taken    Wound Care Consulted? No    Attending Nurse:  Amparo López RN/Staff Member:  Shanon LALA

## 2024-07-18 NOTE — PLAN OF CARE
07/18/24 1005   Discharge Assessment   Assessment Type Discharge Planning Assessment   Confirmed/corrected address, phone number and insurance Yes   Confirmed Demographics Correct on Facesheet   Source of Information patient   When was your last doctors appointment? 07/02/24   Does patient/caregiver understand observation status Yes   Communicated ALEXIS with patient/caregiver Yes   Reason For Admission sob, chest pain, rvr   People in Home spouse   Facility Arrived From: home   Do you expect to return to your current living situation? Yes   Do you have help at home or someone to help you manage your care at home? Yes   Who are your caregiver(s) and their phone number(s)? spouse   Prior to hospitilization cognitive status: Alert/Oriented   Current cognitive status: Alert/Oriented   Walking or Climbing Stairs Difficulty yes   Walking or Climbing Stairs ambulation difficulty, requires equipment   Mobility Management cane prn   Dressing/Bathing Difficulty no   Home Accessibility wheelchair accessible   Equipment Currently Used at Home cane, straight;CPAP;walker, rolling   Readmission within 30 days? No   Patient currently being followed by outpatient case management? No   Do you currently have service(s) that help you manage your care at home? No   Do you take prescription medications? Yes   Do you have any problems affording any of your prescribed medications? No   Is the patient taking medications as prescribed? yes   Who is going to help you get home at discharge? fmly   How do you get to doctors appointments? family or friend will provide   Are you on dialysis? No   Do you take coumadin? No   Discharge Plan A Home   Discharge Plan B Home Health   DME Needed Upon Discharge  none   Discharge Plan discussed with: Patient;Spouse/sig other   Transition of Care Barriers None   Housing Stability   In the last 12 months, was there a time when you were not able to pay the mortgage or rent on time? N   At any time in the  past 12 months, were you homeless or living in a shelter (including now)? N   Transportation Needs   Has the lack of transportation kept you from medical appointments, meetings, work or from getting things needed for daily living? No   Food Insecurity   Within the past 12 months, you worried that your food would run out before you got the money to buy more. Never true   Within the past 12 months, the food you bought just didn't last and you didn't have money to get more. Never true   Utilities   In the past 12 months has the electric, gas, oil, or water company threatened to shut off services in your home? No     Pt and spouse live together. He has cane, CPAP , has not used HH services. Other needs TBD

## 2024-07-18 NOTE — H&P
[facility] - [facility] Medical Telemetry    Cardiology  History and Physical     Patient Name: Cole Oakley  MRN: [id number]  Admission Date: 7/17/2024  Code Status: Full Code   Attending Provider: Isaiah Mckeon MD   Primary Care Physician: Dina Scott MD  Principal Problem:<principal problem not specified>    Patient information was obtained from patient, past medical records, and ER records.     Subjective:     Chief Complaint:  Fatigue; New Onset Afib RVR    HPI:   Patient is a 73 y.o. male known to Dr. St, with a pmh of CAD s/p CABG & PCI, NSTEMI, HFrEF, HTN, Type II DM, and severe AS. He was sent to [facility] ER from Dr. St's office for new onset Afib.  He presented to Dr. St's office on 7.17.24 for a hospital follow up after undergoing a LHC at [facility] for [place]AVR workup and had intravascular lithotripsy using a 4 x 12 shockwave balloon, balloon angioplasty and stenting of the Left Main using a 5 x 12 REAGAN. Upon arrival to the clinic the patient c/o being fatigued and was found to be in Atrial Fibrillation with RVR.  Upon arrival to the ER labs significant for WBC 12.06, Creatinine 1.59, .9, and Troponin 1.834. EKG revealed A Flutter with variable AV block with PVCs.  CXR without acute findings.  The patient was admitted to [facility] for evaluation and treatment of Afib.      PMH:  CAD s/p CABG and recent PCI, Severe Aortic Stenosis, HFrEF, Type II DM, HTN  PSH:  CABG, Lithotripsy & REAGAN to L Main, Prostate sx, Bilateral Hip Arthroplasty, Catartact extraction  Family History: Mother & Father with H/O CAD  Social History: Denies use of tobacco, ETOH, or illicit drugs     Previous Cardiac Diagnostics:   LHC (7.10.24):        CTA CHEST (7.16.24)  There is excellent opacification of the thoracic aorta.  The thoracic aorta is of normal caliber with mild atherosclerotic disease.  There is no aortic dissection.  No large pulmonary embolus.     Heart is mildly enlarged.  There are  aortic valve leaflet calcifications.  There are changes of CABG.     No significantly enlarged thoracic lymph nodes.     There is no pleural effusion.  There are mild chronic changes of the lungs with no new focal consolidation.     There are no acute findings in the imaged upper abdomen.  There are no acute osseous findings.     Impression:     1. No acute findings.  2. Aortic valve leaflet calcifications    Carotid US (7.3.24)  1. The study quality is average.   2. 1-39% stenosis in the proximal right internal carotid artery based on Bluth Criteria.   3. 1-39% stenosis in the proximal left internal carotid artery based on Bluth Criteria.   4. The right vertebral artery is poorly visualized.   5. Antegrade left vertebral artery flow.    TTE (6.13.24):  1. The left ventricle is normal in size. Global left ventricular systolic function is severely decreased. The left ventricular ejection fraction is 20%. Noted left ventricular hypertrophy. Concentric left ventricular hypertrophy is present. It is mild. LVSI= 13.2 ml/m2.  2. The left atrial diameter is mildly increased.  3. Suspect severe aortic valve stenosis is present- Low flow low gradient Severe Aortic valve stenosis . Aortic valve area continuity equation is 0.5 cm². The trans-aortic peak velocity is 2.7 m/s. The trans-aortic mean gradient is 14.7 mmHg. DI= .18.   4. Mild to moderate (1-2+) mitral regurgitation.   5. The study quality is average      Past Medical History:   Diagnosis Date    Chronic kidney disease, unspecified     Dilated cardiomyopathy     Essential (primary) hypertension     GERD (gastroesophageal reflux disease)     Mixed hyperlipidemia     KATELYN (obstructive sleep apnea)     Prostate cancer     Type 2 diabetes mellitus without complications        No past surgical history on file.    Review of patient's allergies indicates:   Allergen Reactions    Iodinated contrast media Swelling     Swelling of hands, feet and glands    Atorvastatin       Other reaction(s): Other (See Comments), Unknown    Contrast media     Iodine      Other reaction(s): Other (See Comments)    Lovastatin      Other reaction(s): Other (See Comments), Unknown    Simvastatin      Other reaction(s): Other (See Comments), Unknown       No current facility-administered medications on file prior to encounter.     Current Outpatient Medications on File Prior to Encounter   Medication Sig    aspirin (ECOTRIN) 81 MG EC tablet Take 81 mg by mouth once daily.    cetirizine (ZYRTEC) 10 MG tablet Take 10 mg by mouth once daily.    clopidogreL (PLAVIX) 75 mg tablet Take 75 mg by mouth once daily.    empagliflozin (JARDIANCE) 10 mg tablet Take 10 mg by mouth once daily.    famotidine (PEPCID) 20 MG tablet Take 20 mg by mouth once daily.    furosemide (LASIX) 20 MG tablet Take 20 mg by mouth once daily.    glipiZIDE (GLUCOTROL) 10 MG tablet Take 10 mg by mouth once daily.    metoprolol succinate (TOPROL-XL) 25 MG 24 hr tablet Take 25 mg by mouth once daily.    rosuvastatin (CRESTOR) 40 MG Tab Take 40 mg by mouth once daily.    albuterol (PROVENTIL/VENTOLIN HFA) 90 mcg/actuation inhaler Inhale 2 puffs into the lungs every 4 (four) hours as needed.    azelastine (ASTELIN) 137 mcg (0.1 %) nasal spray 2 sprays by Nasal route 2 (two) times daily.    cholecalciferol, vitamin D3, (VITAMIN D3) 25 mcg (1,000 unit) capsule Take 1,000 Units by mouth once daily.    ezetimibe (ZETIA) 10 mg tablet Take 10 mg by mouth once daily.    guaiFENesin (MUCINEX) 600 mg 12 hr tablet Take 1,200 mg by mouth 2 (two) times daily.    lisinopriL (PRINIVIL,ZESTRIL) 2.5 MG tablet Take 2.5 mg by mouth once daily. (Patient not taking: Reported on 5/9/2024)    sodium chloride (OCEAN) 0.65 % nasal spray 1 spray by Nasal route as needed for Congestion.     Family History    None       Tobacco Use    Smoking status: Never    Smokeless tobacco: Never   Substance and Sexual Activity    Alcohol use: Not on file    Drug use: Not on file     Sexual activity: Not on file     Review of Systems   Constitutional: Negative.   HENT: Negative.     Cardiovascular:  Negative for chest pain and dyspnea on exertion.   Respiratory:  Negative for cough and shortness of breath.    Musculoskeletal: Negative.      Objective:     Vital Signs (Most Recent):  Temp: 98.9 °F (37.2 °C) (07/18/24 0729)  Pulse: (!) 115 (07/18/24 0755)  Resp: 18 (07/18/24 0729)  BP: 121/64 (07/18/24 0755)  SpO2: 97 % (07/18/24 0729) Vital Signs (24h Range):  Temp:  [97.6 °F (36.4 °C)-98.9 °F (37.2 °C)] 98.9 °F (37.2 °C)  Pulse:  [] 115  Resp:  [12-30] 18  SpO2:  [91 %-100 %] 97 %  BP: ()/(62-90) 121/64     Weight: 93.9 kg (207 lb)  Body mass index is 32.41 kg/m².    SpO2: 97 %         Intake/Output Summary (Last 24 hours) at 7/18/2024 0809  Last data filed at 7/18/2024 0545  Gross per 24 hour   Intake 0 ml   Output --   Net 0 ml       Lines/Drains/Airways       Peripheral Intravenous Line  Duration                  Peripheral IV - Single Lumen 07/17/24 1600 20 G Right Antecubital <1 day                    Physical Exam  Constitutional:       Appearance: Normal appearance.   HENT:      Head: Normocephalic.   Cardiovascular:      Rate and Rhythm: Tachycardia present. Rhythm irregular.      Pulses: Normal pulses.      Heart sounds: Murmur heard.   Pulmonary:      Effort: Pulmonary effort is normal.      Breath sounds: Normal breath sounds.   Skin:     General: Skin is warm and dry.   Neurological:      Mental Status: He is alert and oriented to person, place, and time.   Psychiatric:         Mood and Affect: Mood normal.         Behavior: Behavior normal.         EKG:     Telemetry: Afib RVR     Home Medications:   No current facility-administered medications on file prior to encounter.     Current Outpatient Medications on File Prior to Encounter   Medication Sig Dispense Refill    aspirin (ECOTRIN) 81 MG EC tablet Take 81 mg by mouth once daily.      cetirizine (ZYRTEC) 10 MG tablet  Take 10 mg by mouth once daily.      clopidogreL (PLAVIX) 75 mg tablet Take 75 mg by mouth once daily.      empagliflozin (JARDIANCE) 10 mg tablet Take 10 mg by mouth once daily.      famotidine (PEPCID) 20 MG tablet Take 20 mg by mouth once daily.      furosemide (LASIX) 20 MG tablet Take 20 mg by mouth once daily.      glipiZIDE (GLUCOTROL) 10 MG tablet Take 10 mg by mouth once daily.      metoprolol succinate (TOPROL-XL) 25 MG 24 hr tablet Take 25 mg by mouth once daily.      rosuvastatin (CRESTOR) 40 MG Tab Take 40 mg by mouth once daily.      albuterol (PROVENTIL/VENTOLIN HFA) 90 mcg/actuation inhaler Inhale 2 puffs into the lungs every 4 (four) hours as needed.      azelastine (ASTELIN) 137 mcg (0.1 %) nasal spray 2 sprays by Nasal route 2 (two) times daily.      cholecalciferol, vitamin D3, (VITAMIN D3) 25 mcg (1,000 unit) capsule Take 1,000 Units by mouth once daily.      ezetimibe (ZETIA) 10 mg tablet Take 10 mg by mouth once daily.      guaiFENesin (MUCINEX) 600 mg 12 hr tablet Take 1,200 mg by mouth 2 (two) times daily.      lisinopriL (PRINIVIL,ZESTRIL) 2.5 MG tablet Take 2.5 mg by mouth once daily. (Patient not taking: Reported on 5/9/2024)      sodium chloride (OCEAN) 0.65 % nasal spray 1 spray by Nasal route as needed for Congestion.       Current Schedule Inpatient Medications:   enoxparin  1 mg/kg Subcutaneous Q12H (treatment, non-standard time)    metoprolol tartrate  25 mg Oral BID     Continuous Infusions:    Significant Labs:   Chemistries:   Recent Labs   Lab 07/17/24  1637 07/17/24  2046 07/18/24  0406     --  141   K 4.0  --  5.2*     --  104   CO2 21*  --  26   BUN 20.3  --  20.9   CREATININE 1.59*  --  1.51*   CALCIUM 9.2  --  9.6   BILITOT 0.5  --  1.0   ALKPHOS 66  --  71   ALT 22  --  22   AST 24  --  22   GLUCOSE 99  --  93   MG 2.10  --  2.30   TROPONINI 2.561* 1.834* 2.359*        CBC/Anemia Labs: Coags:    Recent Labs   Lab 07/17/24  1637 07/18/24  0406   WBC 12.06* 9.53    HGB 15.7 15.2   HCT 51.8 49.9    361   MCV 85.6 85.4   RDW 15.9 15.9    Recent Labs   Lab 07/17/24  1637   INR 1.0   APTT 27.6        Significant Imaging: See Above Reports  Assessment and Plan:   Assessment:  New Onset Afib RVR     -CHADsVasc--6  (9.7% Stroke Risk Per Year)     -Currently on FD Lovenox  Acute HFrEF Exacerbation      -.9      -EF 20% per TTE 6.13.24  JORDY on CKD  NSTEMI likely Type II due to Afib RVR, Acute HF Exacerbation, and JORDY on CKD      -Trop 2.561, 1.834, 2.359  Severe Aortic Stenosis     -Scheduled for TAVR 7.22.24  CAD (Multi Vessel)      -s/p CABG and recent  lithotripsy using a 4 x 12 shockwave balloon, balloon angioplasty and stenting of the Left Main using a 5 x 12 REAGAN  Type II DM  HTN          PLAN:  D/C FD Lovenox for now for poss TAVR in am  Change Toprol XL to Metoprolol Tartrate to 50 mg PO BID for better rate control  Trend Trop x 2 more or until down trending  Resume Home medications   TAVR scheduled for 7.22.24;   Will keep NPO for possible TAVR tomorrow instead of Monday  Will Type & Screen and have 2 units PRBC on hold if needed  Continuous Telemetry Monitoring  CBC, CMP, Mag in am            VTE Risk Mitigation (From admission, onward)           Ordered     enoxaparin injection 90 mg  Every 12 hours         07/17/24 1557                    DOC Ibrahim  Cardiology  [facility] - [facility] Telemetry  07/18/2024 8:09 AM

## 2024-07-18 NOTE — NURSING
Nurses Note -- 4 Eyes      7/17/2024   10:17 PM      Skin assessed during: Admit      [x] No Altered Skin Integrity Present    []Prevention Measures Documented      [] Yes- Altered Skin Integrity Present or Discovered   [] LDA Added if Not in Epic (Describe Wound)   [] New Altered Skin Integrity was Present on Admit and Documented in LDA   [] Wound Image Taken    Wound Care Consulted? No    Attending Nurse:  Shanon López RN/Staff Member:   Jarocho

## 2024-07-19 LAB
ALBUMIN SERPL-MCNC: 3.5 G/DL (ref 3.4–4.8)
ALBUMIN/GLOB SERPL: 1 RATIO (ref 1.1–2)
ALP SERPL-CCNC: 80 UNIT/L (ref 40–150)
ALT SERPL-CCNC: 23 UNIT/L (ref 0–55)
ANION GAP SERPL CALC-SCNC: 12 MEQ/L
AST SERPL-CCNC: 20 UNIT/L (ref 5–34)
BASOPHILS # BLD AUTO: 0.06 X10(3)/MCL
BASOPHILS NFR BLD AUTO: 0.7 %
BILIRUB SERPL-MCNC: 1.3 MG/DL
BUN SERPL-MCNC: 22.7 MG/DL (ref 8.4–25.7)
CALCIUM SERPL-MCNC: 9.9 MG/DL (ref 8.8–10)
CHLORIDE SERPL-SCNC: 105 MMOL/L (ref 98–107)
CO2 SERPL-SCNC: 22 MMOL/L (ref 23–31)
CREAT SERPL-MCNC: 1.51 MG/DL (ref 0.73–1.18)
CREAT/UREA NIT SERPL: 15
EOSINOPHIL # BLD AUTO: 0.12 X10(3)/MCL (ref 0–0.9)
EOSINOPHIL NFR BLD AUTO: 1.4 %
ERYTHROCYTE [DISTWIDTH] IN BLOOD BY AUTOMATED COUNT: 16.3 % (ref 11.5–17)
GFR SERPLBLD CREATININE-BSD FMLA CKD-EPI: 48 ML/MIN/1.73/M2
GLOBULIN SER-MCNC: 3.6 GM/DL (ref 2.4–3.5)
GLUCOSE SERPL-MCNC: 112 MG/DL (ref 82–115)
HCT VFR BLD AUTO: 50.8 % (ref 42–52)
HGB BLD-MCNC: 15.8 G/DL (ref 14–18)
IMM GRANULOCYTES # BLD AUTO: 0.05 X10(3)/MCL (ref 0–0.04)
IMM GRANULOCYTES NFR BLD AUTO: 0.6 %
LYMPHOCYTES # BLD AUTO: 1.4 X10(3)/MCL (ref 0.6–4.6)
LYMPHOCYTES NFR BLD AUTO: 15.8 %
MAGNESIUM SERPL-MCNC: 2.4 MG/DL (ref 1.6–2.6)
MCH RBC QN AUTO: 26.3 PG (ref 27–31)
MCHC RBC AUTO-ENTMCNC: 31.1 G/DL (ref 33–36)
MCV RBC AUTO: 84.7 FL (ref 80–94)
MONOCYTES # BLD AUTO: 0.96 X10(3)/MCL (ref 0.1–1.3)
MONOCYTES NFR BLD AUTO: 10.8 %
NEUTROPHILS # BLD AUTO: 6.29 X10(3)/MCL (ref 2.1–9.2)
NEUTROPHILS NFR BLD AUTO: 70.7 %
NRBC BLD AUTO-RTO: 0 %
OHS QRS DURATION: 120 MS
OHS QRS DURATION: 124 MS
OHS QTC CALCULATION: 504 MS
OHS QTC CALCULATION: 508 MS
PLATELET # BLD AUTO: 345 X10(3)/MCL (ref 130–400)
PMV BLD AUTO: 9.5 FL (ref 7.4–10.4)
POTASSIUM SERPL-SCNC: 5.4 MMOL/L (ref 3.5–5.1)
PROT SERPL-MCNC: 7.1 GM/DL (ref 5.8–7.6)
RBC # BLD AUTO: 6 X10(6)/MCL (ref 4.7–6.1)
SODIUM SERPL-SCNC: 139 MMOL/L (ref 136–145)
WBC # BLD AUTO: 8.88 X10(3)/MCL (ref 4.5–11.5)

## 2024-07-19 PROCEDURE — 85025 COMPLETE CBC W/AUTO DIFF WBC: CPT | Performed by: NURSE PRACTITIONER

## 2024-07-19 PROCEDURE — 83735 ASSAY OF MAGNESIUM: CPT | Performed by: NURSE PRACTITIONER

## 2024-07-19 PROCEDURE — 63600175 PHARM REV CODE 636 W HCPCS

## 2024-07-19 PROCEDURE — 25000003 PHARM REV CODE 250: Performed by: NURSE PRACTITIONER

## 2024-07-19 PROCEDURE — 80053 COMPREHEN METABOLIC PANEL: CPT | Performed by: NURSE PRACTITIONER

## 2024-07-19 PROCEDURE — 25000003 PHARM REV CODE 250

## 2024-07-19 PROCEDURE — 21400001 HC TELEMETRY ROOM

## 2024-07-19 PROCEDURE — 25000003 PHARM REV CODE 250: Performed by: PHYSICIAN ASSISTANT

## 2024-07-19 PROCEDURE — 36415 COLL VENOUS BLD VENIPUNCTURE: CPT | Performed by: NURSE PRACTITIONER

## 2024-07-19 RX ORDER — DIPHENHYDRAMINE HCL 25 MG
25 CAPSULE ORAL EVERY 6 HOURS
Status: DISCONTINUED | OUTPATIENT
Start: 2024-07-21 | End: 2024-07-20

## 2024-07-19 RX ORDER — ENOXAPARIN SODIUM 100 MG/ML
1 INJECTION SUBCUTANEOUS EVERY 12 HOURS
Status: COMPLETED | OUTPATIENT
Start: 2024-07-19 | End: 2024-07-21

## 2024-07-19 RX ORDER — PREDNISONE 50 MG/1
50 TABLET ORAL EVERY 6 HOURS
Status: COMPLETED | OUTPATIENT
Start: 2024-07-21 | End: 2024-07-22

## 2024-07-19 RX ORDER — FAMOTIDINE 20 MG/1
20 TABLET, FILM COATED ORAL EVERY 6 HOURS
Status: COMPLETED | OUTPATIENT
Start: 2024-07-21 | End: 2024-07-22

## 2024-07-19 RX ORDER — ENOXAPARIN SODIUM 100 MG/ML
1 INJECTION SUBCUTANEOUS EVERY 12 HOURS
Status: DISCONTINUED | OUTPATIENT
Start: 2024-07-19 | End: 2024-07-19

## 2024-07-19 RX ADMIN — CLOPIDOGREL BISULFATE 75 MG: 75 TABLET ORAL at 08:07

## 2024-07-19 RX ADMIN — METOPROLOL TARTRATE 50 MG: 50 TABLET, FILM COATED ORAL at 08:07

## 2024-07-19 RX ADMIN — GUAIFENESIN 1200 MG: 600 TABLET, EXTENDED RELEASE ORAL at 09:07

## 2024-07-19 RX ADMIN — ASPIRIN 81 MG: 81 TABLET, COATED ORAL at 08:07

## 2024-07-19 RX ADMIN — SODIUM ZIRCONIUM CYCLOSILICATE 10 G: 10 POWDER, FOR SUSPENSION ORAL at 06:07

## 2024-07-19 RX ADMIN — METOPROLOL TARTRATE 50 MG: 50 TABLET, FILM COATED ORAL at 09:07

## 2024-07-19 RX ADMIN — ENOXAPARIN SODIUM 90 MG: 100 INJECTION SUBCUTANEOUS at 09:07

## 2024-07-19 NOTE — PROGRESS NOTES
"[facility] - [facility] Telemetry    Cardiology  Progress Note    Patient Name: Cole Oakley  MRN: [id number]  Admission Date: 7/17/2024  Hospital Length of Stay: 2 days  Code Status: Full Code   Attending Physician: Isaiah Mckeon MD   Primary Care Physician: Dina Scott MD  Expected Discharge Date:   Principal Problem:<principal problem not specified>    Subjective:     Brief HPI/Hospital Course: Patient is a 73 y.o. male known to Dr. St, with a pmh of CAD s/p CABG & PCI, NSTEMI, HFrEF, HTN, Type II DM, and severe AS. He was sent to [facility] ER from Dr. St's office for new onset Afib.  He presented to Dr. St's office on 7.17.24 for a hospital follow up after undergoing a LHC at [facility] [place] [place] for TAVR workup and had intravascular lithotripsy using a 4 x 12 shockwave balloon, balloon angioplasty and stenting of the Left Main using a 5 x 12 REAGAN. Upon arrival to the clinic the patient c/o being fatigued and was found to be in Atrial Fibrillation with RVR.  Upon arrival to the ER labs significant for WBC 12.06, Creatinine 1.59, .9, and Troponin 1.834. EKG revealed A Flutter with variable AV block with PVCs.  CXR without acute findings.  The patient was admitted to [facility] for evaluation and treatment of Afib.    7.19.24: NAD. VSS. AFIB CVR. Sitting in the chair. No complaints of CP/SOB/Palps. "I feel okay" Creat 1.51, K 5.4     PMH:  CAD s/p CABG and recent PCI, Severe Aortic Stenosis, HFrEF, Type II DM, HTN  PSH:  CABG, Lithotripsy & REAGAN to L Main, Prostate sx, Bilateral Hip Arthroplasty, Catartact extraction  Family History: Mother & Father with H/O CAD  Social History: Denies use of tobacco, ETOH, or illicit drugs      Previous Cardiac Diagnostics:   LHC (7.10.24):         CTA CHEST (7.16.24)  There is excellent opacification of the thoracic aorta.  The thoracic aorta is of normal caliber with mild atherosclerotic disease.  There is no aortic dissection.  No large pulmonary " embolus.     Heart is mildly enlarged.  There are aortic valve leaflet calcifications.  There are changes of CABG.     No significantly enlarged thoracic lymph nodes.     There is no pleural effusion.  There are mild chronic changes of the lungs with no new focal consolidation.     There are no acute findings in the imaged upper abdomen.  There are no acute osseous findings.     Impression:  No acute findings. Aortic valve leaflet calcifications     Carotid US (7.3.24)  The study quality is average.  1-39% stenosis in the proximal right internal carotid artery based on Bluth Criteria.   1-39% stenosis in the proximal left internal carotid artery based on Bluth Criteria.  The right vertebral artery is poorly visualized.   5.Antegrade left vertebral artery flow.     TTE (6.13.24):  The left ventricle is normal in size. Global left ventricular systolic function is severely decreased. The left ventricular ejection fraction is 20%. Noted left ventricular hypertrophy. Concentric left ventricular hypertrophy is present. It is mild. LVSI= 13.2 ml/m2.The left atrial diameter is mildly increased.  Suspect severe aortic valve stenosis is present- Low flow low gradient Severe Aortic valve stenosis . Aortic valve area continuity equation is 0.5 cm². The trans-aortic peak velocity is 2.7 m/s. The trans-aortic mean gradient is 14.7 mmHg. DI= .18.  Mild to moderate (1-2+) mitral regurgitation.   5.The study quality is average       Review of Systems   Cardiovascular:  Negative for chest pain, irregular heartbeat, leg swelling and palpitations.   Respiratory:  Negative for shortness of breath.    All other systems reviewed and are negative.    Objective:     Vital Signs (Most Recent):  Temp: 97.5 °F (36.4 °C) (07/19/24 0756)  Pulse: (!) 141 (07/19/24 0834)  Resp: 19 (07/19/24 0340)  BP: 117/75 (07/19/24 0834)  SpO2: (!) 93 % (07/19/24 0756) Vital Signs (24h Range):  Temp:  [97.3 °F (36.3 °C)-98.7 °F (37.1 °C)] 97.5 °F (36.4  °C)  Pulse:  [] 141  Resp:  [18-20] 19  SpO2:  [93 %-97 %] 93 %  BP: ()/(63-83) 117/75   Weight: 93.9 kg (207 lb)  Body mass index is 32.41 kg/m².  SpO2: (!) 93 %       Intake/Output Summary (Last 24 hours) at 7/19/2024 1113  Last data filed at 7/18/2024 1418  Gross per 24 hour   Intake 420 ml   Output --   Net 420 ml     Lines/Drains/Airways       Peripheral Intravenous Line  Duration                  Peripheral IV - Single Lumen 07/17/24 1600 20 G Right Antecubital 1 day                    Significant Labs:   Chemistries:   Recent Labs   Lab 07/17/24  1637 07/17/24 2046 07/18/24  0406 07/18/24  1308 07/18/24  1807 07/19/24  0358     --  141  --   --  139   K 4.0  --  5.2* 4.8  --  5.4*     --  104  --   --  105   CO2 21*  --  26  --   --  22*   BUN 20.3  --  20.9  --   --  22.7   CREATININE 1.59*  --  1.51*  --   --  1.51*   CALCIUM 9.2  --  9.6  --   --  9.9   BILITOT 0.5  --  1.0  --   --  1.3   ALKPHOS 66  --  71  --   --  80   ALT 22  --  22  --   --  23   AST 24  --  22  --   --  20   GLUCOSE 99  --  93  --   --  112   MG 2.10  --  2.30 2.30  --  2.40   TROPONINI 2.561* 1.834* 2.359* 2.751* 3.037*  --         CBC/Anemia Labs: Coags:    Recent Labs   Lab 07/17/24 1637 07/18/24  0406 07/19/24  0358   WBC 12.06* 9.53 8.88   HGB 15.7 15.2 15.8   HCT 51.8 49.9 50.8    361 345   MCV 85.6 85.4 84.7   RDW 15.9 15.9 16.3    Recent Labs   Lab 07/17/24  1637   INR 1.0   APTT 27.6        Telemetry:  AFIB CVR    Physical Exam  Vitals and nursing note reviewed.   HENT:      Head: Normocephalic.      Nose: Nose normal.      Mouth/Throat:      Mouth: Mucous membranes are moist.   Eyes:      Pupils: Pupils are equal, round, and reactive to light.   Cardiovascular:      Rate and Rhythm: Normal rate. Rhythm irregular.      Pulses: Normal pulses.      Heart sounds: Murmur heard.   Pulmonary:      Effort: Pulmonary effort is normal.   Abdominal:      General: Bowel sounds are normal.       Palpations: Abdomen is soft.   Musculoskeletal:         General: Normal range of motion.      Cervical back: Normal range of motion.   Skin:     General: Skin is warm.   Neurological:      Mental Status: He is alert and oriented to person, place, and time.   Psychiatric:         Mood and Affect: Mood normal.         Behavior: Behavior normal.       Current Schedule Inpatient Medications:   aspirin  81 mg Oral Daily    atorvastatin  40 mg Oral Daily    azelastine  2 spray Nasal BID    clopidogreL  75 mg Oral Daily    empagliflozin  10 mg Oral Daily    ezetimibe  10 mg Oral Daily    famotidine  20 mg Oral Daily    furosemide  20 mg Oral Daily    glipiZIDE  10 mg Oral Daily    guaiFENesin  1,200 mg Oral BID    metoprolol tartrate  50 mg Oral BID    vitamin D  1,000 Units Oral Daily     Assessment:   Newly Diagnosed AFIB - currently CVR     -CHADsVasc--6  (9.7% Stroke Risk Per Year)     -Currently on FD Lovenox  Acute HFrEF Exacerbation      -.9      -EF 20% per TTE 6.13.24  JORDY on CKD  NSTEMI likely Type II due to Afib RVR, Acute HF Exacerbation, and JORDY on CKD      -Trop 2.561, 1.834, 2.359  Hypothyroidism    - TSH 5.931   Severe Aortic Stenosis     -Scheduled for TAVR 7.22.24  CAD (Multi Vessel)      -s/p CABG and recent  lithotripsy using a 4 x 12 shockwave balloon, balloon angioplasty and stenting of the Left Main using a 5 x 12 REAGAN  Type II DM  HTN  No known history of GI bleed     Plan:   Lokelma Today   Resume FD Lovenox for Stroke Risk Reduction  Plan for TAVR on 7.22.24  Continue Metoprolol Tartrate 50 mg oral BID  Continue ASA, Statin, Plavix, Zetia  Will need Type and Screen at 72 hour ileana  NPO after MN Sunday  Refer to PCP for Thyroid work-up in the outpatient setting  Keep Mag > 2 and Potassium > 4  Labs in AM: CBC, BMP, and Mag    DOC Dickerson  Cardiology  [facility] - [facility] Telemetry

## 2024-07-19 NOTE — NURSING
Nurses Note -- 4 Eyes      7/19/2024   7:38 AM      Skin assessed during: Q Shift Change      [x] No Altered Skin Integrity Present    []Prevention Measures Documented      [] Yes- Altered Skin Integrity Present or Discovered   [] LDA Added if Not in Epic (Describe Wound)   [] New Altered Skin Integrity was Present on Admit and Documented in LDA   [] Wound Image Taken    Wound Care Consulted? No    Attending Nurse:  Aramis PATTON RN     Second RN/Staff Member:   Criselda HICKEY RN

## 2024-07-19 NOTE — AI DETERIORATION ALERT
Artificial Intelligence Notification  [facility]  [address]  [place] [address]  Phone: [phone]    This documentation was triggered by an Artificial Intelligence Notification:    Admit Date: 2024   LOS: 2  Code Status: Full Code  : 1950  Age: 73 y.o.  Weight:   Wt Readings from Last 1 Encounters:   24 93.9 kg (207 lb)        Sex: male  Bed: [address] [place] [address]  MRN: [id number]  Attending Physician: Isaiah Mckeon MD     Date of Alert: 2024  Time AI Alert Received: 1045            Vitals:    24 0834   BP: 117/75   Pulse: (!) 141   Resp:    Temp:      SpO2: (!) 93 %      Artificial Intelligence alert discussed with Provider:     Name: Aramis LALA   Date/Time of Provider Notification: 1100      Patient Condition: Nurse states pt has been in and out of afib,  will notify [facility]. Plan is for TAVR soon. ICU available if needed.

## 2024-07-19 NOTE — NURSING
Nurses Note -- 4 Eyes      7/18/2024   11:31 PM      Skin assessed during: Q Shift Change      [x] No Altered Skin Integrity Present    [x]Prevention Measures Documented      [] Yes- Altered Skin Integrity Present or Discovered   [] LDA Added if Not in Epic (Describe Wound)   [] New Altered Skin Integrity was Present on Admit and Documented in LDA   [] Wound Image Taken    Wound Care Consulted? No    Attending Nurse:  Caryl moran rn     Second RN/Staff Member:  patricia lindsey RN

## 2024-07-20 LAB
ANION GAP SERPL CALC-SCNC: 10 MEQ/L
BASOPHILS # BLD AUTO: 0.05 X10(3)/MCL
BASOPHILS NFR BLD AUTO: 0.5 %
BUN SERPL-MCNC: 23.5 MG/DL (ref 8.4–25.7)
CALCIUM SERPL-MCNC: 9.1 MG/DL (ref 8.8–10)
CHLORIDE SERPL-SCNC: 105 MMOL/L (ref 98–107)
CO2 SERPL-SCNC: 22 MMOL/L (ref 23–31)
CREAT SERPL-MCNC: 1.35 MG/DL (ref 0.73–1.18)
CREAT/UREA NIT SERPL: 17
EOSINOPHIL # BLD AUTO: 0.06 X10(3)/MCL (ref 0–0.9)
EOSINOPHIL NFR BLD AUTO: 0.6 %
ERYTHROCYTE [DISTWIDTH] IN BLOOD BY AUTOMATED COUNT: 15.6 % (ref 11.5–17)
GFR SERPLBLD CREATININE-BSD FMLA CKD-EPI: 55 ML/MIN/1.73/M2
GLUCOSE SERPL-MCNC: 150 MG/DL (ref 82–115)
HCT VFR BLD AUTO: 48 % (ref 42–52)
HGB BLD-MCNC: 14.9 G/DL (ref 14–18)
IMM GRANULOCYTES # BLD AUTO: 0.08 X10(3)/MCL (ref 0–0.04)
IMM GRANULOCYTES NFR BLD AUTO: 0.8 %
LYMPHOCYTES # BLD AUTO: 1.48 X10(3)/MCL (ref 0.6–4.6)
LYMPHOCYTES NFR BLD AUTO: 14.8 %
MAGNESIUM SERPL-MCNC: 2.3 MG/DL (ref 1.6–2.6)
MCH RBC QN AUTO: 26.1 PG (ref 27–31)
MCHC RBC AUTO-ENTMCNC: 31 G/DL (ref 33–36)
MCV RBC AUTO: 84.1 FL (ref 80–94)
MONOCYTES # BLD AUTO: 1.15 X10(3)/MCL (ref 0.1–1.3)
MONOCYTES NFR BLD AUTO: 11.5 %
NEUTROPHILS # BLD AUTO: 7.18 X10(3)/MCL (ref 2.1–9.2)
NEUTROPHILS NFR BLD AUTO: 71.8 %
NRBC BLD AUTO-RTO: 0 %
PLATELET # BLD AUTO: 313 X10(3)/MCL (ref 130–400)
PMV BLD AUTO: 9.5 FL (ref 7.4–10.4)
POTASSIUM SERPL-SCNC: 4.2 MMOL/L (ref 3.5–5.1)
RBC # BLD AUTO: 5.71 X10(6)/MCL (ref 4.7–6.1)
SODIUM SERPL-SCNC: 137 MMOL/L (ref 136–145)
WBC # BLD AUTO: 10 X10(3)/MCL (ref 4.5–11.5)

## 2024-07-20 PROCEDURE — 99024 POSTOP FOLLOW-UP VISIT: CPT | Mod: ,,, | Performed by: PHYSICIAN ASSISTANT

## 2024-07-20 PROCEDURE — 21400001 HC TELEMETRY ROOM

## 2024-07-20 PROCEDURE — 25000003 PHARM REV CODE 250: Performed by: PHYSICIAN ASSISTANT

## 2024-07-20 PROCEDURE — 25000003 PHARM REV CODE 250: Performed by: NURSE PRACTITIONER

## 2024-07-20 PROCEDURE — 36415 COLL VENOUS BLD VENIPUNCTURE: CPT

## 2024-07-20 PROCEDURE — 63600175 PHARM REV CODE 636 W HCPCS: Performed by: NURSE PRACTITIONER

## 2024-07-20 PROCEDURE — 83735 ASSAY OF MAGNESIUM: CPT

## 2024-07-20 PROCEDURE — 25000242 PHARM REV CODE 250 ALT 637 W/ HCPCS: Performed by: PHYSICIAN ASSISTANT

## 2024-07-20 PROCEDURE — 85025 COMPLETE CBC W/AUTO DIFF WBC: CPT

## 2024-07-20 PROCEDURE — 63600175 PHARM REV CODE 636 W HCPCS

## 2024-07-20 PROCEDURE — 80048 BASIC METABOLIC PNL TOTAL CA: CPT

## 2024-07-20 RX ORDER — DIGOXIN 0.25 MG/ML
500 INJECTION INTRAMUSCULAR; INTRAVENOUS ONCE
Status: COMPLETED | OUTPATIENT
Start: 2024-07-20 | End: 2024-07-20

## 2024-07-20 RX ORDER — DIGOXIN 0.25 MG/ML
250 INJECTION INTRAMUSCULAR; INTRAVENOUS
Status: COMPLETED | OUTPATIENT
Start: 2024-07-20 | End: 2024-07-21

## 2024-07-20 RX ORDER — METOPROLOL TARTRATE 50 MG/1
50 TABLET ORAL ONCE
Status: COMPLETED | OUTPATIENT
Start: 2024-07-20 | End: 2024-07-20

## 2024-07-20 RX ORDER — METOPROLOL SUCCINATE 50 MG/1
50 TABLET, EXTENDED RELEASE ORAL DAILY
Status: DISCONTINUED | OUTPATIENT
Start: 2024-07-21 | End: 2024-07-25 | Stop reason: HOSPADM

## 2024-07-20 RX ORDER — DIPHENHYDRAMINE HCL 25 MG
50 CAPSULE ORAL EVERY 6 HOURS
Status: COMPLETED | OUTPATIENT
Start: 2024-07-21 | End: 2024-07-22

## 2024-07-20 RX ORDER — FUROSEMIDE 10 MG/ML
40 INJECTION INTRAMUSCULAR; INTRAVENOUS ONCE
Status: COMPLETED | OUTPATIENT
Start: 2024-07-20 | End: 2024-07-20

## 2024-07-20 RX ADMIN — DIGOXIN 500 MCG: 0.25 INJECTION INTRAMUSCULAR; INTRAVENOUS at 04:07

## 2024-07-20 RX ADMIN — DIGOXIN 250 MCG: 0.25 INJECTION INTRAMUSCULAR; INTRAVENOUS at 08:07

## 2024-07-20 RX ADMIN — ENOXAPARIN SODIUM 90 MG: 100 INJECTION SUBCUTANEOUS at 09:07

## 2024-07-20 RX ADMIN — ENOXAPARIN SODIUM 90 MG: 100 INJECTION SUBCUTANEOUS at 08:07

## 2024-07-20 RX ADMIN — METOPROLOL TARTRATE 50 MG: 50 TABLET, FILM COATED ORAL at 08:07

## 2024-07-20 RX ADMIN — GLIPIZIDE 10 MG: 10 TABLET ORAL at 09:07

## 2024-07-20 RX ADMIN — EZETIMIBE 10 MG: 10 TABLET ORAL at 09:07

## 2024-07-20 RX ADMIN — CLOPIDOGREL BISULFATE 75 MG: 75 TABLET ORAL at 09:07

## 2024-07-20 RX ADMIN — GUAIFENESIN 1200 MG: 600 TABLET, EXTENDED RELEASE ORAL at 09:07

## 2024-07-20 RX ADMIN — FUROSEMIDE 40 MG: 10 INJECTION, SOLUTION INTRAMUSCULAR; INTRAVENOUS at 04:07

## 2024-07-20 RX ADMIN — METOPROLOL TARTRATE 50 MG: 50 TABLET, FILM COATED ORAL at 09:07

## 2024-07-20 RX ADMIN — ATORVASTATIN CALCIUM 40 MG: 40 TABLET, FILM COATED ORAL at 09:07

## 2024-07-20 RX ADMIN — EMPAGLIFLOZIN 10 MG: 10 TABLET, FILM COATED ORAL at 09:07

## 2024-07-20 RX ADMIN — Medication 1000 UNITS: at 09:07

## 2024-07-20 RX ADMIN — ASPIRIN 81 MG: 81 TABLET, COATED ORAL at 09:07

## 2024-07-20 RX ADMIN — FUROSEMIDE 20 MG: 20 TABLET ORAL at 09:07

## 2024-07-20 NOTE — PROGRESS NOTES
CT SURGERY PROGRESS NOTE  Cole Oakley  73 y.o.  1950    Patients Procedure: Procedure(s) (LRB):  REPLACEMENT, AORTIC VALVE, TRANSCATHETER (TAVR) (N/A)    Subjective  Interval History: 72 yo s/p CABG and subsequent PTCA LM known severe AS EF 45% 4 months ago - now decreased to EF 20% [facility]        ROS    Medication List  Infusions    Scheduled   aspirin  81 mg Oral Daily    atorvastatin  40 mg Oral Daily    azelastine  2 spray Nasal BID    clopidogreL  75 mg Oral Daily    [START ON 7/21/2024] diphenhydrAMINE  25 mg Oral Q6H    empagliflozin  10 mg Oral Daily    enoxparin  1 mg/kg Subcutaneous Q12H (prophylaxis, 0900/2100)    ezetimibe  10 mg Oral Daily    [START ON 7/21/2024] famotidine  20 mg Oral Q6H    furosemide  20 mg Oral Daily    glipiZIDE  10 mg Oral Daily    guaiFENesin  1,200 mg Oral BID    metoprolol tartrate  50 mg Oral BID    [START ON 7/21/2024] predniSONE  50 mg Oral Q6H    vitamin D  1,000 Units Oral Daily       Objective:  Recent Vitals:  Temp:  [97.4 °F (36.3 °C)-98.7 °F (37.1 °C)] 98.6 °F (37 °C)  Pulse:  [] 117  Resp:  [18-23] 18  SpO2:  [91 %-98 %] 92 %  BP: (100-112)/(64-79) 100/72    Physical Exam     I/O last 24 hrs:  Intake/Output - Last 3 Shifts         07/18 0700 07/19 0659 07/19 0700 07/20 0659 07/20 0700  07/21 0659    P.O. 420 960     Total Intake(mL/kg) 420 (4.5) 960 (10.4)     Net +420 +960            Urine Occurrence 2 x 2 x     Stool Occurrence 0 x 1 x             Labs  BMP:   Recent Labs   Lab 07/20/24  0416      K 4.2      CO2 22*   BUN 23.5   CREATININE 1.35*   CALCIUM 9.1   MG 2.30     CBC:   Recent Labs   Lab 07/20/24  0416   WBC 10.00   RBC 5.71   HGB 14.9   HCT 48.0      MCV 84.1   MCH 26.1*   MCHC 31.0*     CMP:   Recent Labs   Lab 07/19/24  0358 07/20/24  0416   CALCIUM 9.9 9.1   ALBUMIN 3.5  --     137   K 5.4* 4.2   CO2 22* 22*    105   BUN 22.7 23.5   CREATININE 1.51* 1.35*    ALKPHOS 80  --    ALT 23  --    AST 20  --    BILITOT 1.3  --          Imaging:   CXR: No results found in the last 24 hours.        ASSESSMENT/PLAN:     Severe AS ZULY 0.5cm2  EF 20%   CAD s/p CABG and PTCAs/ Shockwave  HTN  DM2  Afib   Elevated creat 1.5     Plan- TAVR per Dr Peres for TAVR on Monday       Case and plan of care discussed with MD Gilson Bowling PA-C

## 2024-07-20 NOTE — NURSING
Nurses Note -- 4 Eyes      7/20/2024   12:35 AM      Skin assessed during: Q Shift Change      [x] No Altered Skin Integrity Present    []Prevention Measures Documented      [] Yes- Altered Skin Integrity Present or Discovered   [] LDA Added if Not in Epic (Describe Wound)   [] New Altered Skin Integrity was Present on Admit and Documented in LDA   [] Wound Image Taken    Wound Care Consulted? No    Attending Nurse:  Caryl HICKEY RN     Second RN/Staff Member:  Aramis PATTON RN

## 2024-07-20 NOTE — NURSING
Nurses Note -- 4 Eyes      7/20/2024   1:12 PM      Skin assessed during: Q Shift Change      [x] No Altered Skin Integrity Present    []Prevention Measures Documented      [] Yes- Altered Skin Integrity Present or Discovered   [] LDA Added if Not in Epic (Describe Wound)   [] New Altered Skin Integrity was Present on Admit and Documented in LDA   [] Wound Image Taken    Wound Care Consulted? No    Attending Nurse:  Caitlyn López RN/Staff Member:   Caryl

## 2024-07-20 NOTE — PROGRESS NOTES
"[facility] Tele[facility]    Cardiology  Progress Note    Patient Name: Cole Oakley  MRN: [id number]  Admission Date: 7/17/2024  Hospital Length of Stay: 3 days  Code Status: Full Code   Attending Physician: Isaiah Mckeon MD   Primary Care Physician: Dina Scott MD  Expected Discharge Date:   Principal Problem:<principal problem not specified>    Subjective:     Brief HPI/Hospital Course: Patient is a 73 y.o. male known to Dr. St, with a pmh of CAD s/p CABG & PCI, NSTEMI, HFrEF, HTN, Type II DM, and severe AS. He was sent to [facility] ER from Dr. St's office for new onset Afib.  He presented to Dr. St's office on 7.17.24 for a hospital follow up after [place] LHC [place] [facility] [place] and had intravascular lithotripsy using a 4 x 12 shockwave balloon, balloon angioplasty and stenting of the Left Main using a 5 x 12 REAGAN. Upon arrival to the clinic the patient c/o being fatigued and was found to be in Atrial Fibrillation with RVR.  Upon arrival to the ER labs significant for WBC 12.06, Creatinine 1.59, .9, and Troponin 1.834. EKG revealed A Flutter with variable AV block with PVCs.  CXR without acute findings.  The patient was admitted to [facility] for evaluation and treatment of Afib.    7.19.24: NAD. VSS. AFIB CVR. Sitting in the chair. No complaints of CP/SOB/Palps. "I feel okay" Creat 1.51, K 5.4  7.20.24: Patient reporting SOB and "retaining fluid" despite oral lasix. Remains Afib, RVR. BP marginal        PMH:  CAD s/p CABG and recent PCI, Severe Aortic Stenosis, HFrEF, Type II DM, HTN  PSH:  CABG, Lithotripsy & REAGAN to L Main, Prostate sx, Bilateral Hip Arthroplasty, Catartact extraction  Family History: Mother & Father with H/O CAD  Social History: Denies use of tobacco, ETOH, or illicit drugs      Previous Cardiac Diagnostics:   C (7.10.24):  mLM 70-80% calcified stenosis on IV status post intravascular lithotripsy using shockwave balloon and " stenting  Lcx mild CAD  MLAD occlusion  SVG to LAD patent at the origin body and site of insertion fills the LAD well in antegrade and retrograde fashion.  Vein graft to OM patent fills the superior branch of OM antegrade and back fills the inferior branch of OM retrograde  RCA occluded in the midportion of the distal PDA and PLB the fills via left-to-right collaterals  Pullback from LV to aorta there was a 20 mm gradient across the valve  Plan  Severe left main stenosis which supplies circumflex artery status post balloon angioplasty and stenting using a 5 x 12 REAGAN  Severe AS likely low-flow low gradient AS will proceed with TAVR workup    Carotid US (7.3.24)  The study quality is average.  1-39% stenosis in the proximal right internal carotid artery based on Bluth Criteria.   1-39% stenosis in the proximal left internal carotid artery based on Bluth Criteria.  The right vertebral artery is poorly visualized.   5.Antegrade left vertebral artery flow.     TTE (6.13.24):  The left ventricle is normal in size. Global left ventricular systolic function is severely decreased. The left ventricular ejection fraction is 20%. Noted left ventricular hypertrophy. Concentric left ventricular hypertrophy is present. It is mild. LVSI= 13.2 ml/m2.The left atrial diameter is mildly increased.  Suspect severe aortic valve stenosis is present- Low flow low gradient Severe Aortic valve stenosis . Aortic valve area continuity equation is 0.5 cm². The trans-aortic peak velocity is 2.7 m/s. The trans-aortic mean gradient is 14.7 mmHg. DI= .18.  Mild to moderate (1-2+) mitral regurgitation.   5.The study quality is average       Review of Systems   Cardiovascular:  Negative for chest pain, irregular heartbeat, leg swelling and palpitations.   Respiratory:  Negative for shortness of breath.    All other systems reviewed and are negative.    Objective:     Vital Signs (Most Recent):  Temp: 98 °F (36.7 °C) (07/20/24 1121)  Pulse: (!) 112  (07/20/24 1121)  Resp: 18 (07/20/24 1121)  BP: 109/65 (07/20/24 1121)  SpO2: (!) 94 % (07/20/24 1121) Vital Signs (24h Range):  Temp:  [97.5 °F (36.4 °C)-98.7 °F (37.1 °C)] 98 °F (36.7 °C)  Pulse:  [] 112  Resp:  [18-19] 18  SpO2:  [91 %-98 %] 94 %  BP: (100-112)/(65-79) 109/65   Weight: 92 kg (202 lb 12.8 oz)  Body mass index is 31.76 kg/m².  SpO2: (!) 94 %       Intake/Output Summary (Last 24 hours) at 7/20/2024 1317  Last data filed at 7/19/2024 1422  Gross per 24 hour   Intake 960 ml   Output --   Net 960 ml     Lines/Drains/Airways       Peripheral Intravenous Line  Duration                  Peripheral IV - Single Lumen 07/17/24 1600 20 G Right Antecubital 2 days                    Significant Labs:   Chemistries:   Recent Labs   Lab 07/17/24  1637 07/17/24  2046 07/18/24  0406 07/18/24  1308 07/18/24  1807 07/19/24  0358 07/20/24  0416     --  141  --   --  139 137   K 4.0  --  5.2* 4.8  --  5.4* 4.2     --  104  --   --  105 105   CO2 21*  --  26  --   --  22* 22*   BUN 20.3  --  20.9  --   --  22.7 23.5   CREATININE 1.59*  --  1.51*  --   --  1.51* 1.35*   CALCIUM 9.2  --  9.6  --   --  9.9 9.1   BILITOT 0.5  --  1.0  --   --  1.3  --    ALKPHOS 66  --  71  --   --  80  --    ALT 22  --  22  --   --  23  --    AST 24  --  22  --   --  20  --    GLUCOSE 99  --  93  --   --  112 150*   MG 2.10  --  2.30 2.30  --  2.40 2.30   TROPONINI 2.561* 1.834* 2.359* 2.751* 3.037*  --   --         CBC/Anemia Labs: Coags:    Recent Labs   Lab 07/18/24  0406 07/19/24  0358 07/20/24  0416   WBC 9.53 8.88 10.00   HGB 15.2 15.8 14.9   HCT 49.9 50.8 48.0    345 313   MCV 85.4 84.7 84.1   RDW 15.9 16.3 15.6    Recent Labs   Lab 07/17/24  1637   INR 1.0   APTT 27.6        Telemetry:  AFIB CVR    Physical Exam  Vitals and nursing note reviewed.   HENT:      Head: Normocephalic.      Nose: Nose normal.      Mouth/Throat:      Mouth: Mucous membranes are moist.   Eyes:      Pupils: Pupils are equal, round,  and reactive to light.   Cardiovascular:      Rate and Rhythm: Normal rate. Rhythm irregular.      Pulses: Normal pulses.      Heart sounds: Murmur heard.   Pulmonary:      Effort: Pulmonary effort is normal.   Abdominal:      General: Bowel sounds are normal.      Palpations: Abdomen is soft.   Musculoskeletal:         General: Normal range of motion.      Cervical back: Normal range of motion.   Skin:     General: Skin is warm.   Neurological:      Mental Status: He is alert and oriented to person, place, and time.   Psychiatric:         Mood and Affect: Mood normal.         Behavior: Behavior normal.       Current Schedule Inpatient Medications:   aspirin  81 mg Oral Daily    atorvastatin  40 mg Oral Daily    azelastine  2 spray Nasal BID    clopidogreL  75 mg Oral Daily    [START ON 7/21/2024] diphenhydrAMINE  25 mg Oral Q6H    empagliflozin  10 mg Oral Daily    enoxparin  1 mg/kg Subcutaneous Q12H (prophylaxis, 0900/2100)    ezetimibe  10 mg Oral Daily    [START ON 7/21/2024] famotidine  20 mg Oral Q6H    furosemide (LASIX) injection  40 mg Intravenous Once    furosemide  20 mg Oral Daily    glipiZIDE  10 mg Oral Daily    guaiFENesin  1,200 mg Oral BID    metoprolol tartrate  50 mg Oral BID    [START ON 7/21/2024] predniSONE  50 mg Oral Q6H    vitamin D  1,000 Units Oral Daily     Assessment:   Newly Diagnosed AFIB - currently CVR     -CHADsVasc--6  (9.7% Stroke Risk Per Year)     -Currently on FD Lovenox  Acute HFrEF Exacerbation      -.9      -EF 20% per TTE 6.13.24  JORDY on CKD  NSTEMI likely Type II due to Afib RVR, Acute HF Exacerbation, and JORDY on CKD      -Trop 2.561, 1.834, 2.359  Hypothyroidism    - TSH 5.931   Severe Aortic Stenosis     -Scheduled for TAVR 7.22.24  CAD (Multi Vessel)      -s/p CABG and recent  lithotripsy using a 4 x 12 shockwave balloon, balloon angioplasty and stenting of the Left Main using a 5 x 12 REAGAN  Type II DM  HTN  No known history of GI bleed   **Iodine  allergy**  Plan:   Give Lasix 40 mg IVP x 1  DC Metoprolol Tartrate. Start Metoprolol succinate 50 mg daily  Start digoxin IV load- 500 mcg IV x 1 then 250 mcg q6hr x 2 doses  Continue FD Lovenox for Stroke Risk Reduction with placement on DOAC prior to DC  Plan for TAVR on 7.22.24. NPO after MN Sunday  Will premedicate with prednisone 50 mg q 6hr x 3, Pepcid 20 mg q 6hr x 3, and  and Benadryl 50 mg q6hr x 3 doses starting Sunday at 1800  Continue ASA, Statin, Plavix, Zetia  Obtain T&S in am- place 2 units PRBCs on hold  Refer to PCP for Thyroid work-up in the outpatient setting  Keep Mag > 2 and Potassium > 4  Labs in AM: CBC, BMP, and Mag    DOC Dimas  Cardiology  [facility] Tele[facility]

## 2024-07-20 NOTE — AI DETERIORATION ALERT
Artificial Intelligence Notification  [facility]  [address] [place] [address]  Phone: [phone]    This documentation was triggered by an Artificial Intelligence Notification:    Admit Date: 2024   LOS: 3  Code Status: Full Code  : 1950  Age: 73 y.o.  Weight:   Wt Readings from Last 1 Encounters:   24 92 kg (202 lb 12.8 oz)        Sex: male  Bed: [address] [place] [address] A  MRN: [id number]  Attending Physician: Isaiah Mckeon MD     Date of Alert: 2024  Time AI Alert Received: 06            Vitals:    24 0547   BP: 111/79   Pulse: (!) 121   Resp:    Temp: 98.7 °F (37.1 °C)     SpO2: 96 %      Artificial Intelligence alert discussed with Provider:     Name: na   Date/Time of Provider Notification: 614      Patient Condition: see previous AI note. No changes

## 2024-07-21 LAB
ALBUMIN SERPL-MCNC: 3.3 G/DL (ref 3.4–4.8)
ALBUMIN/GLOB SERPL: 0.9 RATIO (ref 1.1–2)
ALP SERPL-CCNC: 73 UNIT/L (ref 40–150)
ALT SERPL-CCNC: 20 UNIT/L (ref 0–55)
ANION GAP SERPL CALC-SCNC: 8 MEQ/L
AST SERPL-CCNC: 20 UNIT/L (ref 5–34)
BASOPHILS # BLD AUTO: 0.05 X10(3)/MCL
BASOPHILS NFR BLD AUTO: 0.5 %
BILIRUB SERPL-MCNC: 1.9 MG/DL
BUN SERPL-MCNC: 21.7 MG/DL (ref 8.4–25.7)
CALCIUM SERPL-MCNC: 9.5 MG/DL (ref 8.8–10)
CHLORIDE SERPL-SCNC: 104 MMOL/L (ref 98–107)
CO2 SERPL-SCNC: 28 MMOL/L (ref 23–31)
CREAT SERPL-MCNC: 1.5 MG/DL (ref 0.73–1.18)
CREAT/UREA NIT SERPL: 14
EOSINOPHIL # BLD AUTO: 0.09 X10(3)/MCL (ref 0–0.9)
EOSINOPHIL NFR BLD AUTO: 0.9 %
ERYTHROCYTE [DISTWIDTH] IN BLOOD BY AUTOMATED COUNT: 15.9 % (ref 11.5–17)
GFR SERPLBLD CREATININE-BSD FMLA CKD-EPI: 48 ML/MIN/1.73/M2
GLOBULIN SER-MCNC: 3.8 GM/DL (ref 2.4–3.5)
GLUCOSE SERPL-MCNC: 103 MG/DL (ref 82–115)
GROUP & RH: NORMAL
HCT VFR BLD AUTO: 49.7 % (ref 42–52)
HGB BLD-MCNC: 15.4 G/DL (ref 14–18)
IMM GRANULOCYTES # BLD AUTO: 0.06 X10(3)/MCL (ref 0–0.04)
IMM GRANULOCYTES NFR BLD AUTO: 0.6 %
INDIRECT COOMBS: NORMAL
LYMPHOCYTES # BLD AUTO: 1.27 X10(3)/MCL (ref 0.6–4.6)
LYMPHOCYTES NFR BLD AUTO: 12.9 %
MCH RBC QN AUTO: 26.6 PG (ref 27–31)
MCHC RBC AUTO-ENTMCNC: 31 G/DL (ref 33–36)
MCV RBC AUTO: 85.7 FL (ref 80–94)
MONOCYTES # BLD AUTO: 1.2 X10(3)/MCL (ref 0.1–1.3)
MONOCYTES NFR BLD AUTO: 12.2 %
NEUTROPHILS # BLD AUTO: 7.14 X10(3)/MCL (ref 2.1–9.2)
NEUTROPHILS NFR BLD AUTO: 72.9 %
NRBC BLD AUTO-RTO: 0 %
PLATELET # BLD AUTO: 328 X10(3)/MCL (ref 130–400)
PMV BLD AUTO: 9.7 FL (ref 7.4–10.4)
POTASSIUM SERPL-SCNC: 4.3 MMOL/L (ref 3.5–5.1)
PROT SERPL-MCNC: 7.1 GM/DL (ref 5.8–7.6)
RBC # BLD AUTO: 5.8 X10(6)/MCL (ref 4.7–6.1)
SODIUM SERPL-SCNC: 140 MMOL/L (ref 136–145)
SPECIMEN OUTDATE: NORMAL
WBC # BLD AUTO: 9.81 X10(3)/MCL (ref 4.5–11.5)

## 2024-07-21 PROCEDURE — 86850 RBC ANTIBODY SCREEN: CPT | Performed by: NURSE PRACTITIONER

## 2024-07-21 PROCEDURE — 25000003 PHARM REV CODE 250: Performed by: NURSE PRACTITIONER

## 2024-07-21 PROCEDURE — 80053 COMPREHEN METABOLIC PANEL: CPT | Performed by: NURSE PRACTITIONER

## 2024-07-21 PROCEDURE — 25000003 PHARM REV CODE 250: Performed by: PHYSICIAN ASSISTANT

## 2024-07-21 PROCEDURE — 85025 COMPLETE CBC W/AUTO DIFF WBC: CPT | Performed by: NURSE PRACTITIONER

## 2024-07-21 PROCEDURE — 99024 POSTOP FOLLOW-UP VISIT: CPT | Mod: ,,, | Performed by: PHYSICIAN ASSISTANT

## 2024-07-21 PROCEDURE — 25000003 PHARM REV CODE 250

## 2024-07-21 PROCEDURE — 63600175 PHARM REV CODE 636 W HCPCS

## 2024-07-21 PROCEDURE — 86923 COMPATIBILITY TEST ELECTRIC: CPT | Mod: 91 | Performed by: NURSE PRACTITIONER

## 2024-07-21 PROCEDURE — 86900 BLOOD TYPING SEROLOGIC ABO: CPT | Performed by: NURSE PRACTITIONER

## 2024-07-21 PROCEDURE — 25000242 PHARM REV CODE 250 ALT 637 W/ HCPCS: Performed by: PHYSICIAN ASSISTANT

## 2024-07-21 PROCEDURE — 36415 COLL VENOUS BLD VENIPUNCTURE: CPT | Performed by: NURSE PRACTITIONER

## 2024-07-21 PROCEDURE — 21400001 HC TELEMETRY ROOM

## 2024-07-21 PROCEDURE — 63600175 PHARM REV CODE 636 W HCPCS: Performed by: NURSE PRACTITIONER

## 2024-07-21 RX ORDER — FUROSEMIDE 10 MG/ML
40 INJECTION INTRAMUSCULAR; INTRAVENOUS ONCE
Status: COMPLETED | OUTPATIENT
Start: 2024-07-21 | End: 2024-07-21

## 2024-07-21 RX ORDER — DIGOXIN 125 MCG
0.12 TABLET ORAL DAILY
Status: DISCONTINUED | OUTPATIENT
Start: 2024-07-22 | End: 2024-07-25 | Stop reason: HOSPADM

## 2024-07-21 RX ADMIN — PREDNISONE 50 MG: 50 TABLET ORAL at 05:07

## 2024-07-21 RX ADMIN — Medication 1000 UNITS: at 08:07

## 2024-07-21 RX ADMIN — FUROSEMIDE 40 MG: 10 INJECTION, SOLUTION INTRAMUSCULAR; INTRAVENOUS at 01:07

## 2024-07-21 RX ADMIN — DIPHENHYDRAMINE HYDROCHLORIDE 50 MG: 25 CAPSULE ORAL at 05:07

## 2024-07-21 RX ADMIN — CLOPIDOGREL BISULFATE 75 MG: 75 TABLET ORAL at 08:07

## 2024-07-21 RX ADMIN — FAMOTIDINE 20 MG: 20 TABLET, FILM COATED ORAL at 05:07

## 2024-07-21 RX ADMIN — DIPHENHYDRAMINE HYDROCHLORIDE 50 MG: 25 CAPSULE ORAL at 11:07

## 2024-07-21 RX ADMIN — FUROSEMIDE 20 MG: 20 TABLET ORAL at 08:07

## 2024-07-21 RX ADMIN — DIGOXIN 250 MCG: 0.25 INJECTION INTRAMUSCULAR; INTRAVENOUS at 03:07

## 2024-07-21 RX ADMIN — GLIPIZIDE 10 MG: 10 TABLET ORAL at 08:07

## 2024-07-21 RX ADMIN — ASPIRIN 81 MG: 81 TABLET, COATED ORAL at 08:07

## 2024-07-21 RX ADMIN — FAMOTIDINE 20 MG: 20 TABLET, FILM COATED ORAL at 11:07

## 2024-07-21 RX ADMIN — METOPROLOL SUCCINATE 50 MG: 50 TABLET, EXTENDED RELEASE ORAL at 08:07

## 2024-07-21 RX ADMIN — ATORVASTATIN CALCIUM 40 MG: 40 TABLET, FILM COATED ORAL at 08:07

## 2024-07-21 RX ADMIN — GUAIFENESIN 1200 MG: 600 TABLET, EXTENDED RELEASE ORAL at 10:07

## 2024-07-21 RX ADMIN — GUAIFENESIN 1200 MG: 600 TABLET, EXTENDED RELEASE ORAL at 08:07

## 2024-07-21 RX ADMIN — PREDNISONE 50 MG: 50 TABLET ORAL at 11:07

## 2024-07-21 RX ADMIN — EMPAGLIFLOZIN 10 MG: 10 TABLET, FILM COATED ORAL at 08:07

## 2024-07-21 RX ADMIN — EZETIMIBE 10 MG: 10 TABLET ORAL at 08:07

## 2024-07-21 RX ADMIN — ENOXAPARIN SODIUM 90 MG: 100 INJECTION SUBCUTANEOUS at 08:07

## 2024-07-21 NOTE — NURSING
Nurses Note -- 4 Eyes      7/21/2024   10:23 AM      Skin assessed during: Q Shift Change      [x] No Altered Skin Integrity Present    []Prevention Measures Documented      [] Yes- Altered Skin Integrity Present or Discovered   [] LDA Added if Not in Epic (Describe Wound)   [] New Altered Skin Integrity was Present on Admit and Documented in LDA   [] Wound Image Taken    Wound Care Consulted? No    Attending Nurse:  Caitlyn López RN/Staff Member:  Gui

## 2024-07-21 NOTE — PROGRESS NOTES
"[facility] Tele[facility]    Cardiology  Progress Note    Patient Name: Cole Oakley  MRN: [id number]  Admission Date: 7/17/2024  Hospital Length of Stay: 4 days  Code Status: Full Code   Attending Physician: Isaiah Mckeon MD   Primary Care Physician: Dina Scott MD  Expected Discharge Date:   Principal Problem:<principal problem not specified>    Subjective:     Brief HPI/Hospital Course: Patient is a 73 y.o. male known to Dr. St, with a pmh of CAD s/p CABG & PCI, NSTEMI, HFrEF, HTN, Type II DM, and severe AS. He was sent to [facility] ER from Dr. St's office for new onset Afib.  He presented to Dr. St's office on 7.17.24 for a hospital follow up after [place] [facility] [place] and had intravascular lithotripsy using a 4 x 12 shockwave balloon, balloon angioplasty and stenting of the Left Main using a 5 x 12 REAGAN. Upon arrival to the clinic the patient c/o being fatigued and was found to be in Atrial Fibrillation with RVR.  Upon arrival to the ER labs significant for WBC 12.06, Creatinine 1.59, .9, and Troponin 1.834. EKG revealed A Flutter with variable AV block with PVCs.  CXR without acute findings.  The patient was admitted to [facility] for evaluation and treatment of Afib.    7.19.24: NAD. VSS. AFIB CVR. Sitting in the chair. No complaints of CP/SOB/Palps. "I feel okay" Creat 1.51, K 5.4  7.20.24: Patient reporting SOB and "retaining fluid" despite oral lasix. Remains Afib, RVR. BP marginal   7.21.24: Patient sitting up at bedside. NAD. He reports feeling better with additional dose of lasix yesterday but is still carrying extra fluid. Renal indices mildly bumped         PMH:  CAD s/p CABG and recent PCI, Severe Aortic Stenosis, HFrEF, Type II DM, HTN  PSH:  CABG, Lithotripsy & REAGAN to L Main, Prostate sx, Bilateral Hip Arthroplasty, Catartact extraction  Family History: Mother & Father with H/O CAD  Social History: Denies use of tobacco, " ETOH, or illicit drugs      Previous Cardiac Diagnostics:   [facility] (7.10.24):  mLM 70-80% calcified stenosis on IV status post intravascular lithotripsy using shockwave balloon and stenting  Lcx mild CAD  MLAD occlusion  SVG to LAD patent at the origin body and site of insertion fills the LAD well in antegrade and retrograde fashion.  Vein graft to OM patent fills the superior branch of OM antegrade and back fills the inferior branch of OM retrograde  RCA occluded in the midportion of the distal PDA and PLB the fills via left-to-right collaterals  Pullback from LV to aorta there was a 20 mm gradient across the valve  Plan  Severe left main stenosis which supplies circumflex artery status post balloon angioplasty and stenting using a 5 x 12 REAGAN  Severe AS likely low-flow low gradient AS will proceed with TAVR workup    Carotid US (7.3.24)  The study quality is average.  1-39% stenosis in the proximal right internal carotid artery based on Bluth Criteria.   1-39% stenosis in the proximal left internal carotid artery based on Bluth Criteria.  The right vertebral artery is poorly visualized.   5.Antegrade left vertebral artery flow.     TTE (6.13.24):  The left ventricle is normal in size. Global left ventricular systolic function is severely decreased. The left ventricular ejection fraction is 20%. Noted left ventricular hypertrophy. Concentric left ventricular hypertrophy is present. It is mild. LVSI= 13.2 ml/m2.The left atrial diameter is mildly increased.  Suspect severe aortic valve stenosis is present- Low flow low gradient Severe Aortic valve stenosis . Aortic valve area continuity equation is 0.5 cm². The trans-aortic peak velocity is 2.7 m/s. The trans-aortic mean gradient is 14.7 mmHg. DI= .18.  Mild to moderate (1-2+) mitral regurgitation.   5.The study quality is average       Review of Systems   Cardiovascular:  Negative for chest pain, irregular heartbeat, leg swelling and palpitations.   Respiratory:   Negative for shortness of breath.    All other systems reviewed and are negative.    Objective:     Vital Signs (Most Recent):  Temp: 98.3 °F (36.8 °C) (07/21/24 1131)  Pulse: 82 (07/21/24 1131)  Resp: 18 (07/21/24 1131)  BP: 101/62 (07/21/24 1131)  SpO2: 96 % (07/21/24 1131) Vital Signs (24h Range):  Temp:  [98 °F (36.7 °C)-98.6 °F (37 °C)] 98.3 °F (36.8 °C)  Pulse:  [] 82  Resp:  [18-20] 18  SpO2:  [91 %-97 %] 96 %  BP: ()/(59-70) 101/62   Weight: 92 kg (202 lb 12.8 oz)  Body mass index is 31.76 kg/m².  SpO2: 96 %       Intake/Output Summary (Last 24 hours) at 7/21/2024 1238  Last data filed at 7/20/2024 1355  Gross per 24 hour   Intake 480 ml   Output --   Net 480 ml     Lines/Drains/Airways       Peripheral Intravenous Line  Duration                  Peripheral IV - Single Lumen 07/17/24 1600 20 G Right Antecubital 3 days                    Significant Labs:   Chemistries:   Recent Labs   Lab 07/17/24  1637 07/17/24  2046 07/18/24  0406 07/18/24  1308 07/18/24  1807 07/19/24  0358 07/20/24  0416 07/21/24  0518     --  141  --   --  139 137 140   K 4.0  --  5.2* 4.8  --  5.4* 4.2 4.3     --  104  --   --  105 105 104   CO2 21*  --  26  --   --  22* 22* 28   BUN 20.3  --  20.9  --   --  22.7 23.5 21.7   CREATININE 1.59*  --  1.51*  --   --  1.51* 1.35* 1.50*   CALCIUM 9.2  --  9.6  --   --  9.9 9.1 9.5   BILITOT 0.5  --  1.0  --   --  1.3  --  1.9*   ALKPHOS 66  --  71  --   --  80  --  73   ALT 22  --  22  --   --  23  --  20   AST 24  --  22  --   --  20  --  20   GLUCOSE 99  --  93  --   --  112 150* 103   MG 2.10  --  2.30 2.30  --  2.40 2.30  --    TROPONINI 2.561* 1.834* 2.359* 2.751* 3.037*  --   --   --         CBC/Anemia Labs: Coags:    Recent Labs   Lab 07/19/24  0358 07/20/24  0416 07/21/24  0518   WBC 8.88 10.00 9.81   HGB 15.8 14.9 15.4   HCT 50.8 48.0 49.7    313 328   MCV 84.7 84.1 85.7   RDW 16.3 15.6 15.9    Recent Labs   Lab 07/17/24  1637   INR 1.0   APTT 27.6         Telemetry:  AFIB CVR    Physical Exam  Vitals and nursing note reviewed.   HENT:      Head: Normocephalic.      Nose: Nose normal.      Mouth/Throat:      Mouth: Mucous membranes are moist.   Eyes:      Pupils: Pupils are equal, round, and reactive to light.   Cardiovascular:      Rate and Rhythm: Normal rate. Rhythm irregular.      Pulses: Normal pulses.      Heart sounds: Murmur heard.   Pulmonary:      Effort: Pulmonary effort is normal.   Abdominal:      General: Bowel sounds are normal.      Palpations: Abdomen is soft.   Musculoskeletal:         General: Normal range of motion.      Cervical back: Normal range of motion.   Skin:     General: Skin is warm.   Neurological:      Mental Status: He is alert and oriented to person, place, and time.   Psychiatric:         Mood and Affect: Mood normal.         Behavior: Behavior normal.       Current Schedule Inpatient Medications:   aspirin  81 mg Oral Daily    atorvastatin  40 mg Oral Daily    azelastine  2 spray Nasal BID    clopidogreL  75 mg Oral Daily    diphenhydrAMINE  50 mg Oral Q6H    empagliflozin  10 mg Oral Daily    ezetimibe  10 mg Oral Daily    famotidine  20 mg Oral Q6H    furosemide  20 mg Oral Daily    glipiZIDE  10 mg Oral Daily    guaiFENesin  1,200 mg Oral BID    metoprolol succinate  50 mg Oral Daily    predniSONE  50 mg Oral Q6H    vitamin D  1,000 Units Oral Daily     Assessment:   Newly Diagnosed AFIB - currently CVR     -CHADsVasc--6  (9.7% Stroke Risk Per Year)     -Currently on FD Lovenox  Acute HFrEF Exacerbation      -.9      -EF 20% per TTE 6.13.24  JORDY on CKD  NSTEMI likely Type II due to Afib RVR, Acute HF Exacerbation, and JORDY on CKD      -Trop 2.561, 1.834, 2.359  Hypothyroidism    - TSH 5.931   Severe Aortic Stenosis     -Scheduled for TAVR 7.22.24  CAD (Multi Vessel)      -s/p CABG and recent  lithotripsy using a 4 x 12 shockwave balloon, balloon angioplasty and stenting of the Left Main using a 5 x 12 REAGAN  Type II  DM  HTN  No known history of GI bleed   **Iodine allergy**  Plan:   Repeat Lasix 40 mg IVP x 1  Continue Metoprolol succinate 50 mg daily  Continue digoxin 125 mcg po daily  Digoxin level on 7.23.24  Continue FD Lovenox for Stroke Risk Reduction with placement on DOAC prior to DC. Hold Lovenox post MN  Plan for TAVR on 7.22.24. NPO after MN Sunday  Will premedicate with prednisone 50 mg q 6hr x 3, Pepcid 20 mg q 6hr x 3, and  and Benadryl 50 mg q6hr x 3 doses starting Sunday at 1800  Continue ASA, Statin, Plavix, Zetia  Obtain T&S -  2 units PRBCs placed on hold  Refer to PCP for Thyroid work-up in the outpatient setting  Keep Mag > 2 and Potassium > 4  Labs in AM: CBC, BMP, and Mag    DOC Dimas  Cardiology  [facility]

## 2024-07-21 NOTE — PROGRESS NOTES
CT SURGERY PROGRESS NOTE  Cole Oakley  73 y.o.  1950    Patients Procedure: Procedure(s) (LRB):  REPLACEMENT, AORTIC VALVE, TRANSCATHETER (TAVR) (N/A)    Subjective  Interval History: 74 yo s/p CABG and subsequent PTCA LM known severe AS EF 45% 4 months ago - now decreased to EF 20% recently seen in [facility] clinic with new onset afib        ROS    Medication List  Infusions    Scheduled   aspirin  81 mg Oral Daily    atorvastatin  40 mg Oral Daily    azelastine  2 spray Nasal BID    clopidogreL  75 mg Oral Daily    diphenhydrAMINE  50 mg Oral Q6H    empagliflozin  10 mg Oral Daily    ezetimibe  10 mg Oral Daily    famotidine  20 mg Oral Q6H    furosemide  20 mg Oral Daily    glipiZIDE  10 mg Oral Daily    guaiFENesin  1,200 mg Oral BID    metoprolol succinate  50 mg Oral Daily    predniSONE  50 mg Oral Q6H    vitamin D  1,000 Units Oral Daily       Objective:  Recent Vitals:  Temp:  [98 °F (36.7 °C)-98.6 °F (37 °C)] 98 °F (36.7 °C)  Pulse:  [] 70  Resp:  [18-20] 18  SpO2:  [91 %-97 %] 95 %  BP: ()/(59-70) 98/63    Physical Exam     I/O last 24 hrs:  Intake/Output - Last 3 Shifts         07/19 0700 07/20 0659 07/20 0700 07/21 0659 07/21 0700  07/22 0659    P.O. 960 480     Total Intake(mL/kg) 960 (10.4) 480 (5.2)     Net +960 +480            Urine Occurrence 2 x 5 x     Stool Occurrence 1 x 1 x             Labs  BMP:   Recent Labs   Lab 07/20/24  0416 07/21/24  0518    140   K 4.2 4.3    104   CO2 22* 28   BUN 23.5 21.7   CREATININE 1.35* 1.50*   CALCIUM 9.1 9.5   MG 2.30  --      CBC:   Recent Labs   Lab 07/21/24  0518   WBC 9.81   RBC 5.80   HGB 15.4   HCT 49.7      MCV 85.7   MCH 26.6*   MCHC 31.0*     CMP:   Recent Labs   Lab 07/21/24  0518   CALCIUM 9.5   ALBUMIN 3.3*      K 4.3   CO2 28      BUN 21.7   CREATININE 1.50*   ALKPHOS 73   ALT 20   AST 20   BILITOT 1.9*         Imaging:   CXR: No results found in the last 24 hours.        ASSESSMENT/PLAN:     Severe  AS ZULY 0.5cm2  EF 20%   CAD s/p CABG and PTCAs/ Shockwave  HTN  DM2  Afib   Elevated creat 1.5     Plan- TAVR per Dr Peres for TAVR tomorrow     Case and plan of care discussed with MD Gilson Bowling, LIBERTAD

## 2024-07-22 ENCOUNTER — ANESTHESIA EVENT (OUTPATIENT)
Dept: CARDIOLOGY | Facility: HOSPITAL | Age: 74
End: 2024-07-22
Payer: OTHER GOVERNMENT

## 2024-07-22 ENCOUNTER — ANESTHESIA (OUTPATIENT)
Dept: CARDIOLOGY | Facility: HOSPITAL | Age: 74
End: 2024-07-22
Payer: OTHER GOVERNMENT

## 2024-07-22 LAB
ABO + RH BLD: NORMAL
ABO + RH BLD: NORMAL
ALBUMIN SERPL-MCNC: 3.4 G/DL (ref 3.4–4.8)
ALBUMIN/GLOB SERPL: 0.8 RATIO (ref 1.1–2)
ALP SERPL-CCNC: 73 UNIT/L (ref 40–150)
ALT SERPL-CCNC: 21 UNIT/L (ref 0–55)
ANION GAP SERPL CALC-SCNC: 12 MEQ/L
APTT PPP: 33.6 SECONDS (ref 23.2–33.7)
AST SERPL-CCNC: 18 UNIT/L (ref 5–34)
AV MEAN GRADIENT: 2 MMHG
AV PEAK GRADIENT: 5 MMHG
BASOPHILS # BLD AUTO: 0.02 X10(3)/MCL
BASOPHILS NFR BLD AUTO: 0.2 %
BILIRUB SERPL-MCNC: 1.6 MG/DL
BLD PROD TYP BPU: NORMAL
BLD PROD TYP BPU: NORMAL
BLOOD UNIT EXPIRATION DATE: NORMAL
BLOOD UNIT EXPIRATION DATE: NORMAL
BLOOD UNIT TYPE CODE: 5100
BLOOD UNIT TYPE CODE: 5100
BSA FOR ECHO PROCEDURE: 2.09 M2
BUN SERPL-MCNC: 26.5 MG/DL (ref 8.4–25.7)
CALCIUM SERPL-MCNC: 9.7 MG/DL (ref 8.8–10)
CHLORIDE SERPL-SCNC: 103 MMOL/L (ref 98–107)
CO2 SERPL-SCNC: 24 MMOL/L (ref 23–31)
CREAT SERPL-MCNC: 1.61 MG/DL (ref 0.73–1.18)
CREAT/UREA NIT SERPL: 16
CROSSMATCH INTERPRETATION: NORMAL
CROSSMATCH INTERPRETATION: NORMAL
DISPENSE STATUS: NORMAL
DISPENSE STATUS: NORMAL
DOP CALC AO PEAK VEL: 1.16 M/S
DOP CALC AO VTI: 16.3 CM
EOSINOPHIL # BLD AUTO: 0 X10(3)/MCL (ref 0–0.9)
EOSINOPHIL NFR BLD AUTO: 0 %
ERYTHROCYTE [DISTWIDTH] IN BLOOD BY AUTOMATED COUNT: 16.1 % (ref 11.5–17)
FIO2: 100
GFR SERPLBLD CREATININE-BSD FMLA CKD-EPI: 44 ML/MIN/1.73/M2
GLOBULIN SER-MCNC: 4.1 GM/DL (ref 2.4–3.5)
GLUCOSE SERPL-MCNC: 167 MG/DL (ref 82–115)
HCT VFR BLD AUTO: 50.9 % (ref 42–52)
HGB BLD-MCNC: 15.8 G/DL (ref 14–18)
IMM GRANULOCYTES # BLD AUTO: 0.05 X10(3)/MCL (ref 0–0.04)
IMM GRANULOCYTES NFR BLD AUTO: 0.6 %
LDH SERPL L TO P-CCNC: 1.51 MMOL/L (ref 0.36–1.25)
LYMPHOCYTES # BLD AUTO: 0.65 X10(3)/MCL (ref 0.6–4.6)
LYMPHOCYTES NFR BLD AUTO: 7.7 %
MCH RBC QN AUTO: 26.2 PG (ref 27–31)
MCHC RBC AUTO-ENTMCNC: 31 G/DL (ref 33–36)
MCV RBC AUTO: 84.4 FL (ref 80–94)
MONOCYTES # BLD AUTO: 0.27 X10(3)/MCL (ref 0.1–1.3)
MONOCYTES NFR BLD AUTO: 3.2 %
NEUTROPHILS # BLD AUTO: 7.44 X10(3)/MCL (ref 2.1–9.2)
NEUTROPHILS NFR BLD AUTO: 88.3 %
NRBC BLD AUTO-RTO: 0 %
PLATELET # BLD AUTO: 376 X10(3)/MCL (ref 130–400)
PMV BLD AUTO: 9.8 FL (ref 7.4–10.4)
POC ACTIVATED CLOTTING TIME K: 140 SEC (ref 74–137)
POC TEMPERATURE: ABNORMAL
POTASSIUM SERPL-SCNC: 4.8 MMOL/L (ref 3.5–5.1)
PROT SERPL-MCNC: 7.5 GM/DL (ref 5.8–7.6)
RBC # BLD AUTO: 6.03 X10(6)/MCL (ref 4.7–6.1)
SAMPLE: ABNORMAL
SAMPLE: ABNORMAL
SODIUM SERPL-SCNC: 139 MMOL/L (ref 136–145)
UNIT NUMBER: NORMAL
UNIT NUMBER: NORMAL
WBC # BLD AUTO: 8.43 X10(3)/MCL (ref 4.5–11.5)

## 2024-07-22 PROCEDURE — 25000003 PHARM REV CODE 250: Performed by: PHYSICIAN ASSISTANT

## 2024-07-22 PROCEDURE — 33361 REPLACE AORTIC VALVE PERQ: CPT | Mod: Q0 | Performed by: INTERNAL MEDICINE

## 2024-07-22 PROCEDURE — 85730 THROMBOPLASTIN TIME PARTIAL: CPT | Performed by: NURSE PRACTITIONER

## 2024-07-22 PROCEDURE — 27000221 HC OXYGEN, UP TO 24 HOURS

## 2024-07-22 PROCEDURE — 27800903 OPTIME MED/SURG SUP & DEVICES OTHER IMPLANTS: Performed by: INTERNAL MEDICINE

## 2024-07-22 PROCEDURE — C1760 CLOSURE DEV, VASC: HCPCS | Performed by: INTERNAL MEDICINE

## 2024-07-22 PROCEDURE — 25000003 PHARM REV CODE 250: Performed by: NURSE PRACTITIONER

## 2024-07-22 PROCEDURE — 36415 COLL VENOUS BLD VENIPUNCTURE: CPT | Performed by: NURSE PRACTITIONER

## 2024-07-22 PROCEDURE — 63600175 PHARM REV CODE 636 W HCPCS: Performed by: STUDENT IN AN ORGANIZED HEALTH CARE EDUCATION/TRAINING PROGRAM

## 2024-07-22 PROCEDURE — 80162 ASSAY OF DIGOXIN TOTAL: CPT | Performed by: NURSE PRACTITIONER

## 2024-07-22 PROCEDURE — C1883 ADAPT/EXT, PACING/NEURO LEAD: HCPCS | Performed by: INTERNAL MEDICINE

## 2024-07-22 PROCEDURE — 63600175 PHARM REV CODE 636 W HCPCS: Performed by: INTERNAL MEDICINE

## 2024-07-22 PROCEDURE — 93010 ELECTROCARDIOGRAM REPORT: CPT | Mod: ,,, | Performed by: INTERNAL MEDICINE

## 2024-07-22 PROCEDURE — 93005 ELECTROCARDIOGRAM TRACING: CPT

## 2024-07-22 PROCEDURE — C1769 GUIDE WIRE: HCPCS | Performed by: INTERNAL MEDICINE

## 2024-07-22 PROCEDURE — 80053 COMPREHEN METABOLIC PANEL: CPT | Performed by: NURSE PRACTITIONER

## 2024-07-22 PROCEDURE — 63600175 PHARM REV CODE 636 W HCPCS: Performed by: NURSE PRACTITIONER

## 2024-07-22 PROCEDURE — 25000003 PHARM REV CODE 250

## 2024-07-22 PROCEDURE — 85025 COMPLETE CBC W/AUTO DIFF WBC: CPT | Performed by: NURSE PRACTITIONER

## 2024-07-22 PROCEDURE — 21400001 HC TELEMETRY ROOM

## 2024-07-22 PROCEDURE — 37000009 HC ANESTHESIA EA ADD 15 MINS: Performed by: INTERNAL MEDICINE

## 2024-07-22 PROCEDURE — 25500020 PHARM REV CODE 255: Performed by: INTERNAL MEDICINE

## 2024-07-22 PROCEDURE — 36620 INSERTION CATHETER ARTERY: CPT | Mod: 59,,, | Performed by: ANESTHESIOLOGY

## 2024-07-22 PROCEDURE — 02RF38Z REPLACEMENT OF AORTIC VALVE WITH ZOOPLASTIC TISSUE, PERCUTANEOUS APPROACH: ICD-10-PCS | Performed by: INTERNAL MEDICINE

## 2024-07-22 PROCEDURE — 25000003 PHARM REV CODE 250: Performed by: INTERNAL MEDICINE

## 2024-07-22 PROCEDURE — C1894 INTRO/SHEATH, NON-LASER: HCPCS | Performed by: INTERNAL MEDICINE

## 2024-07-22 PROCEDURE — 63600175 PHARM REV CODE 636 W HCPCS

## 2024-07-22 PROCEDURE — 37000008 HC ANESTHESIA 1ST 15 MINUTES: Performed by: INTERNAL MEDICINE

## 2024-07-22 PROCEDURE — C1751 CATH, INF, PER/CENT/MIDLINE: HCPCS | Performed by: INTERNAL MEDICINE

## 2024-07-22 PROCEDURE — 33361 REPLACE AORTIC VALVE PERQ: CPT | Mod: 62,Q0,, | Performed by: THORACIC SURGERY (CARDIOTHORACIC VASCULAR SURGERY)

## 2024-07-22 PROCEDURE — 25000003 PHARM REV CODE 250: Performed by: STUDENT IN AN ORGANIZED HEALTH CARE EDUCATION/TRAINING PROGRAM

## 2024-07-22 DEVICE — VALVE SAPIEN 3 ULT COMMND 26MM: Type: IMPLANTABLE DEVICE | Site: AORTA | Status: FUNCTIONAL

## 2024-07-22 DEVICE — ANGIO-SEAL VIP VASCULAR CLOSURE DEVICE
Type: IMPLANTABLE DEVICE | Site: FEMORAL | Status: FUNCTIONAL
Brand: ANGIO-SEAL

## 2024-07-22 DEVICE — DEVICE MANTA CLOSURE 18FR: Type: IMPLANTABLE DEVICE | Site: FEMORAL | Status: FUNCTIONAL

## 2024-07-22 RX ORDER — DIPHENHYDRAMINE HYDROCHLORIDE 50 MG/ML
25 INJECTION INTRAMUSCULAR; INTRAVENOUS EVERY 6 HOURS PRN
Status: DISCONTINUED | OUTPATIENT
Start: 2024-07-22 | End: 2024-07-22

## 2024-07-22 RX ORDER — FENTANYL CITRATE 50 UG/ML
INJECTION, SOLUTION INTRAMUSCULAR; INTRAVENOUS
Status: DISCONTINUED | OUTPATIENT
Start: 2024-07-22 | End: 2024-07-22

## 2024-07-22 RX ORDER — HYDROMORPHONE HYDROCHLORIDE 2 MG/ML
0.2 INJECTION, SOLUTION INTRAMUSCULAR; INTRAVENOUS; SUBCUTANEOUS EVERY 5 MIN PRN
Status: DISCONTINUED | OUTPATIENT
Start: 2024-07-22 | End: 2024-07-22

## 2024-07-22 RX ORDER — ACETAMINOPHEN 325 MG/1
650 TABLET ORAL EVERY 4 HOURS PRN
Status: DISCONTINUED | OUTPATIENT
Start: 2024-07-22 | End: 2024-07-25 | Stop reason: HOSPADM

## 2024-07-22 RX ORDER — HEPARIN SODIUM 1000 [USP'U]/ML
INJECTION, SOLUTION INTRAVENOUS; SUBCUTANEOUS
Status: DISCONTINUED | OUTPATIENT
Start: 2024-07-22 | End: 2024-07-22

## 2024-07-22 RX ORDER — LIDOCAINE HYDROCHLORIDE 10 MG/ML
INJECTION, SOLUTION EPIDURAL; INFILTRATION; INTRACAUDAL; PERINEURAL
Status: DISCONTINUED | OUTPATIENT
Start: 2024-07-22 | End: 2024-07-22 | Stop reason: HOSPADM

## 2024-07-22 RX ORDER — GLUCAGON 1 MG
1 KIT INJECTION
Status: DISCONTINUED | OUTPATIENT
Start: 2024-07-22 | End: 2024-07-22

## 2024-07-22 RX ORDER — IOPAMIDOL 755 MG/ML
INJECTION, SOLUTION INTRAVASCULAR
Status: DISCONTINUED | OUTPATIENT
Start: 2024-07-22 | End: 2024-07-22 | Stop reason: HOSPADM

## 2024-07-22 RX ORDER — MEPERIDINE HYDROCHLORIDE 25 MG/ML
12.5 INJECTION INTRAMUSCULAR; INTRAVENOUS; SUBCUTANEOUS ONCE
Status: DISCONTINUED | OUTPATIENT
Start: 2024-07-22 | End: 2024-07-22

## 2024-07-22 RX ORDER — ONDANSETRON HYDROCHLORIDE 2 MG/ML
4 INJECTION, SOLUTION INTRAVENOUS ONCE
Status: DISCONTINUED | OUTPATIENT
Start: 2024-07-22 | End: 2024-07-22

## 2024-07-22 RX ORDER — LIDOCAINE HYDROCHLORIDE 20 MG/ML
INJECTION INTRAVENOUS
Status: DISCONTINUED | OUTPATIENT
Start: 2024-07-22 | End: 2024-07-22

## 2024-07-22 RX ORDER — PROTAMINE SULFATE 10 MG/ML
INJECTION, SOLUTION INTRAVENOUS
Status: DISCONTINUED | OUTPATIENT
Start: 2024-07-22 | End: 2024-07-22

## 2024-07-22 RX ORDER — PHENYLEPHRINE HCL IN 0.9% NACL 1 MG/10 ML
SYRINGE (ML) INTRAVENOUS
Status: DISCONTINUED | OUTPATIENT
Start: 2024-07-22 | End: 2024-07-22

## 2024-07-22 RX ORDER — ONDANSETRON HYDROCHLORIDE 2 MG/ML
4 INJECTION, SOLUTION INTRAVENOUS EVERY 6 HOURS PRN
Status: DISCONTINUED | OUTPATIENT
Start: 2024-07-22 | End: 2024-07-25 | Stop reason: HOSPADM

## 2024-07-22 RX ORDER — HYDROMORPHONE HYDROCHLORIDE 2 MG/ML
0.5 INJECTION, SOLUTION INTRAMUSCULAR; INTRAVENOUS; SUBCUTANEOUS EVERY 5 MIN PRN
Status: DISCONTINUED | OUTPATIENT
Start: 2024-07-22 | End: 2024-07-22

## 2024-07-22 RX ORDER — MIDAZOLAM HYDROCHLORIDE 1 MG/ML
INJECTION INTRAMUSCULAR; INTRAVENOUS
Status: DISCONTINUED | OUTPATIENT
Start: 2024-07-22 | End: 2024-07-22

## 2024-07-22 RX ORDER — MORPHINE SULFATE 4 MG/ML
2 INJECTION, SOLUTION INTRAMUSCULAR; INTRAVENOUS
Status: DISCONTINUED | OUTPATIENT
Start: 2024-07-22 | End: 2024-07-25 | Stop reason: HOSPADM

## 2024-07-22 RX ORDER — HYDROCODONE BITARTRATE AND ACETAMINOPHEN 5; 325 MG/1; MG/1
1 TABLET ORAL EVERY 4 HOURS PRN
Status: DISCONTINUED | OUTPATIENT
Start: 2024-07-22 | End: 2024-07-25 | Stop reason: HOSPADM

## 2024-07-22 RX ORDER — SODIUM CHLORIDE 9 MG/ML
INJECTION, SOLUTION INTRAVENOUS CONTINUOUS
Status: ACTIVE | OUTPATIENT
Start: 2024-07-22 | End: 2024-07-22

## 2024-07-22 RX ORDER — ONDANSETRON HYDROCHLORIDE 2 MG/ML
INJECTION, SOLUTION INTRAVENOUS
Status: DISCONTINUED | OUTPATIENT
Start: 2024-07-22 | End: 2024-07-22

## 2024-07-22 RX ORDER — HYDRALAZINE HYDROCHLORIDE 20 MG/ML
10 INJECTION INTRAMUSCULAR; INTRAVENOUS EVERY 4 HOURS PRN
Status: DISCONTINUED | OUTPATIENT
Start: 2024-07-22 | End: 2024-07-25 | Stop reason: HOSPADM

## 2024-07-22 RX ORDER — PROPOFOL 10 MG/ML
VIAL (ML) INTRAVENOUS
Status: DISCONTINUED | OUTPATIENT
Start: 2024-07-22 | End: 2024-07-22

## 2024-07-22 RX ADMIN — MIDAZOLAM HYDROCHLORIDE 2 MG: 1 INJECTION, SOLUTION INTRAMUSCULAR; INTRAVENOUS at 07:07

## 2024-07-22 RX ADMIN — ASPIRIN 81 MG: 81 TABLET, COATED ORAL at 06:07

## 2024-07-22 RX ADMIN — PREDNISONE 50 MG: 50 TABLET ORAL at 06:07

## 2024-07-22 RX ADMIN — SODIUM CHLORIDE, SODIUM GLUCONATE, SODIUM ACETATE, POTASSIUM CHLORIDE AND MAGNESIUM CHLORIDE: 526; 502; 368; 37; 30 INJECTION, SOLUTION INTRAVENOUS at 07:07

## 2024-07-22 RX ADMIN — FENTANYL CITRATE 25 MCG: 50 INJECTION, SOLUTION INTRAMUSCULAR; INTRAVENOUS at 08:07

## 2024-07-22 RX ADMIN — FAMOTIDINE 20 MG: 20 TABLET, FILM COATED ORAL at 06:07

## 2024-07-22 RX ADMIN — METOPROLOL TARTRATE 1 MG: 1 INJECTION, SOLUTION INTRAVENOUS at 08:07

## 2024-07-22 RX ADMIN — DEXTROSE MONOHYDRATE 1.5 G: 5 INJECTION INTRAVENOUS at 09:07

## 2024-07-22 RX ADMIN — FENTANYL CITRATE 25 MCG: 50 INJECTION, SOLUTION INTRAMUSCULAR; INTRAVENOUS at 07:07

## 2024-07-22 RX ADMIN — Medication 100 MCG: at 07:07

## 2024-07-22 RX ADMIN — Medication 200 MCG: at 07:07

## 2024-07-22 RX ADMIN — PROTAMINE SULFATE 50 MG: 10 INJECTION, SOLUTION INTRAVENOUS at 08:07

## 2024-07-22 RX ADMIN — DEXTROSE MONOHYDRATE 1.5 G: 5 INJECTION INTRAVENOUS at 07:07

## 2024-07-22 RX ADMIN — CLOPIDOGREL BISULFATE 75 MG: 75 TABLET ORAL at 06:07

## 2024-07-22 RX ADMIN — DIPHENHYDRAMINE HYDROCHLORIDE 50 MG: 25 CAPSULE ORAL at 06:07

## 2024-07-22 RX ADMIN — GUAIFENESIN 1200 MG: 600 TABLET, EXTENDED RELEASE ORAL at 09:07

## 2024-07-22 RX ADMIN — METOPROLOL SUCCINATE 50 MG: 50 TABLET, EXTENDED RELEASE ORAL at 06:07

## 2024-07-22 RX ADMIN — ONDANSETRON 4 MG: 2 INJECTION INTRAMUSCULAR; INTRAVENOUS at 08:07

## 2024-07-22 RX ADMIN — PHENYLEPHRINE HYDROCHLORIDE 25 MCG/KG/MIN: 10 INJECTION INTRAVENOUS at 07:07

## 2024-07-22 RX ADMIN — LIDOCAINE HYDROCHLORIDE 50 MG: 20 INJECTION INTRAVENOUS at 07:07

## 2024-07-22 RX ADMIN — PROPOFOL 50 MG: 10 INJECTION, EMULSION INTRAVENOUS at 07:07

## 2024-07-22 RX ADMIN — HEPARIN SODIUM 10000 UNITS: 1000 INJECTION, SOLUTION INTRAVENOUS; SUBCUTANEOUS at 08:07

## 2024-07-22 NOTE — ANESTHESIA POSTPROCEDURE EVALUATION
Anesthesia Post Evaluation    Patient: Cole Oakley    Procedure(s) Performed: Procedure(s) (LRB):  REPLACEMENT, AORTIC VALVE, TRANSCATHETER (TAVR) (N/A)    Final Anesthesia Type: general      Patient location during evaluation: PACU  Patient participation: Yes- Able to Participate  Level of consciousness: awake and alert  Post-procedure vital signs: reviewed and stable  Pain management: adequate  Airway patency: patent      Anesthetic complications: no      Cardiovascular status: hemodynamically stable  Respiratory status: unassisted  Hydration status: euvolemic  Follow-up not needed.              Vitals Value Taken Time   /92 07/22/24 0942   Temp 36.6 °C (97.8 °F) 07/22/24 0940   Pulse 95 07/22/24 0947   Resp 29 07/22/24 0947   SpO2 98 % 07/22/24 0947   Vitals shown include unfiled device data.      Event Time   Out of Recovery 07/22/2024 09:48:09         Pain/Vik Score: Vik Score: 4 (7/22/2024  9:00 AM)

## 2024-07-22 NOTE — TRANSFER OF CARE
"Anesthesia Transfer of Care Note    Patient: Cole Oakley    Procedure(s) Performed: Procedure(s) (LRB):  REPLACEMENT, AORTIC VALVE, TRANSCATHETER (TAVR) (N/A)    Patient location: PACU    Anesthesia Type: MAC    Transport from OR: Transported from OR on room air with adequate spontaneous ventilation    Post pain: adequate analgesia    Post assessment: no apparent anesthetic complications    Post vital signs: stable    Level of consciousness: sedated    Nausea/Vomiting: no nausea/vomiting    Complications: none    Transfer of care protocol was followed      Last vitals: Visit Vitals  /78   Pulse 104   Temp 36.4 °C (97.5 °F)   Resp 15   Ht 5' 7" (1.702 m)   Wt 92 kg (202 lb 12.8 oz)   SpO2 100%   BMI 31.76 kg/m²     "

## 2024-07-22 NOTE — ANESTHESIA PROCEDURE NOTES
Arterial    Diagnosis: Aortic stenosis [I35.0]  Doctor requesting consult: Josiah    Patient location during procedure: done in OR  Timeout: 7/22/2024 7:32 AM  Procedure end time: 7/22/2024 7:38 AM    Staffing  Authorizing Provider: Saundra Boyce MD  Performing Provider: Samreen Bennett CRNA    Staffing  Performed by: Samreen Bennett CRNA  Authorized by: Saundra Boyce MD    Anesthesiologist was present at the time of the procedure.    Preanesthetic Checklist  Completed: patient identified, IV checked, site marked, risks and benefits discussed, surgical consent, monitors and equipment checked, pre-op evaluation, timeout performed and anesthesia consent givenArterial  Skin Prep: chlorhexidine gluconate  Local Infiltration: lidocaine  Orientation: right  Location: radial    Catheter Size: 20 G  Catheter placement by Ultrasound guidance. Heme positive aspiration all ports.   Vessel Caliber: small, patent, compressibility normal  Vascular Doppler:  not done  Needle advanced into vessel with real time Ultrasound guidance.  Guidewire confirmed in vessel.Insertion Attempts: 1  Assessment  Dressing: secured with tape and tegaderm  Patient: Tolerated well

## 2024-07-22 NOTE — ANESTHESIA PREPROCEDURE EVALUATION
"                                                                                                             07/22/2024  Cole Oakley is a 73 y.o., male  with a pmh of CAD s/p CABG & PCI, NSTEMI, HFrEF, HTN, Type II DM, and severe AS. He was sent to [facility] ER from Dr. St's office for new onset Afib.  He presented to Dr. St's office on 7.17.24 for a hospital follow up after undergoing a LHC at [facility] for TAVR workup and had intravascular lithotripsy using a 4 x 12 shockwave balloon, balloon angioplasty and stenting of the Left Main using a 5 x 12 REAGAN. Upon arrival to the clinic the patient c/o being fatigued and was found to be in Atrial Fibrillation with RVR.  Upon arrival to the ER labs significant for WBC 12.06, Creatinine 1.59, .9, and Troponin 1.834. EKG revealed A Flutter with variable AV block with PVCs.  CXR without acute findings.  The patient was admitted to [facility] for evaluation and treatment of Afib.     7.19.24: NAD. VSS. AFIB CVR. Sitting in the chair. No complaints of CP/SOB/Palps. "I feel okay" Creat 1.51, K 5.4  7.20.24: Patient reporting SOB and "retaining fluid" despite oral lasix. Remains Afib, RVR. BP marginal   7.21.24: Patient sitting up at bedside. NAD. He reports feeling better with additional dose of lasix yesterday but is still carrying extra fluid. Renal indices mildly bumped           PMH:  CAD s/p CABG and recent PCI, Severe Aortic Stenosis, HFrEF, Type II DM, HTN  PSH:  CABG, Lithotripsy & REAGAN to L Main, Prostate sx, Bilateral Hip Arthroplasty, Catartact extraction  Family History: Mother & Father with H/O CAD  Social History: Denies use of tobacco, ETOH, or illicit drugs      Previous Cardiac Diagnostics:   [facility] (7.10.24):  mLM 70-80% calcified stenosis on IV status post intravascular lithotripsy using shockwave balloon and stenting  Lcx mild CAD  MLAD occlusion  SVG to LAD patent at the origin body and site of insertion fills the LAD well in antegrade and retrograde " fashion.  Vein graft to OM patent fills the superior branch of OM antegrade and back fills the inferior branch of OM retrograde  RCA occluded in the midportion of the distal PDA and PLB the fills via left-to-right collaterals  Pullback from LV to aorta there was a 20 mm gradient across the valve  Plan  Severe left main stenosis which supplies circumflex artery status post balloon angioplasty and stenting using a 5 x 12 REAGAN    Carotid US (7.3.24)  The study quality is average.  1-39% stenosis in the proximal right internal carotid artery based on Bluth Criteria.   1-39% stenosis in the proximal left internal carotid artery based on Bluth Criteria.  The right vertebral artery is poorly visualized.   5.Antegrade left vertebral artery flow.     TTE (6.13.24):  The left ventricle is normal in size. Global left ventricular systolic function is severely decreased. The left ventricular ejection fraction is 20%. Noted left ventricular hypertrophy. Concentric left ventricular hypertrophy is present. It is mild. LVSI= 13.2 ml/m2.The left atrial diameter is mildly increased.  Suspect severe aortic valve stenosis is present- Low flow low gradient Severe Aortic valve stenosis . Aortic valve area continuity equation is 0.5 cm². The trans-aortic peak velocity is 2.7 m/s. The trans-aortic mean gradient is 14.7 mmHg. DI= .18.  Mild to moderate (1-2+) mitral regurgitation.   5.The study quality is average    Severe AS likely low-flow low gradient AS    Pre-op Assessment    I have reviewed the Patient Summary Reports.     I have reviewed the Nursing Notes. I have reviewed the NPO Status.   I have reviewed the Medications.     Review of Systems  Cardiovascular:     Hypertension                                  Hypertension         Pulmonary:        Sleep Apnea     Obstructive Sleep Apnea (KATELYN).           Renal/:  Chronic Renal Disease        Kidney Function/Disease             Hepatic/GI:     GERD      Gerd           Endocrine:  Diabetes    Diabetes                             Anesthesia Plan  Type of Anesthesia, risks & benefits discussed:    Anesthesia Type: Gen Natural Airway  Intra-op Monitoring Plan: Standard ASA Monitors and Art Line  Post Op Pain Control Plan: multimodal analgesia and IV/PO Opioids PRN  Airway Plan: Direct, Post-Induction  Informed Consent: Informed consent signed with the Patient and all parties understand the risks and agree with anesthesia plan.  All questions answered. Patient consented to blood products? Yes  ASA Score: 3  Day of Surgery Review of History & Physical: H&P Update referred to the surgeon/provider.  Anesthesia Plan Notes: LMA if needed.    Ready For Surgery From Anesthesia Perspective.     .

## 2024-07-22 NOTE — PROGRESS NOTES
"[facility] Medical Tele[facility]    Cardiology  Progress Note    Patient Name: Cole Oakley  MRN: [id number]  Admission Date: 7/17/2024  Hospital Length of Stay: 5 days  Code Status: Full Code   Attending Physician: Isaiah Mckeon MD   Primary Care Physician: Dina Scott MD  Expected Discharge Date:   Principal Problem:<principal problem not specified>    Subjective:     Brief HPI/Hospital Course: Patient is a 73 y.o. male known to Dr. St, with a pmh of CAD s/p CABG & PCI, NSTEMI, HFrEF, HTN, Type II DM, and severe AS. He was sent to [facility] ER from Dr. St's office for new onset Afib.  He presented to Dr. St's office on 7.17.24 for a hospital follow up after undergoing a LHC at [facility] for TAVR workup and had intravascular lithotripsy using a 4 x 12 shockwave balloon, balloon angioplasty and stenting of the Left Main using a 5 x 12 REAGAN. Upon arrival to the clinic the patient c/o being fatigued and was found to be in Atrial Fibrillation with RVR.  Upon arrival to the ER labs significant for WBC 12.06, Creatinine 1.59, .9, and Troponin 1.834. EKG revealed A Flutter with variable AV block with PVCs.  CXR without acute findings.  The patient was admitted to [facility] for evaluation and treatment of Afib.    7.19.24: NAD. VSS. AFIB CVR. Sitting in the chair. No complaints of CP/SOB/Palps. "I feel okay" Creat 1.51, K 5.4  7.20.24: Patient reporting SOB and "retaining fluid" despite oral lasix. Remains Afib, RVR. BP marginal   7.21.24: Patient sitting up at bedside. NAD. He reports feeling better with additional dose of lasix yesterday but is still carrying extra fluid. Renal indices mildly bumped  7.22.24: Pt post TAVR still in bed rest. NAD.  States "I already feel so much better."  A Flutter on tele with CVR.  VSS.           PMH:  CAD s/p CABG and recent PCI, Severe Aortic Stenosis, HFrEF, Type II DM, HTN  PSH:  CABG, Lithotripsy & REAGAN to L Main, Prostate sx, Bilateral Hip " Arthroplasty, Catartact extraction  Family History: Mother & Father with H/O CAD  Social History: Denies use of tobacco, ETOH, or illicit drugs      Previous Cardiac Diagnostics:   [facility] (7.10.24):  mLM 70-80% calcified stenosis on IV status post intravascular lithotripsy using shockwave balloon and stenting  Lcx mild CAD  MLAD occlusion  SVG to LAD patent at the origin body and site of insertion fills the LAD well in antegrade and retrograde fashion.  Vein graft to OM patent fills the superior branch of OM antegrade and back fills the inferior branch of OM retrograde  RCA occluded in the midportion of the distal PDA and PLB the fills via left-to-right collaterals  Pullback from LV to aorta there was a 20 mm gradient across the valve  Plan  Severe left main stenosis which supplies circumflex artery status post balloon angioplasty and stenting using a 5 x 12 REAGAN  Severe AS likely low-flow low gradient AS will proceed with TAVR workup    Carotid US (7.3.24)  The study quality is average.  1-39% stenosis in the proximal right internal carotid artery based on Bluth Criteria.   1-39% stenosis in the proximal left internal carotid artery based on Bluth Criteria.  The right vertebral artery is poorly visualized.   5.Antegrade left vertebral artery flow.     TTE (6.13.24):  The left ventricle is normal in size. Global left ventricular systolic function is severely decreased. The left ventricular ejection fraction is 20%. Noted left ventricular hypertrophy. Concentric left ventricular hypertrophy is present. It is mild. LVSI= 13.2 ml/m2.The left atrial diameter is mildly increased.  Suspect severe aortic valve stenosis is present- Low flow low gradient Severe Aortic valve stenosis . Aortic valve area continuity equation is 0.5 cm². The trans-aortic peak velocity is 2.7 m/s. The trans-aortic mean gradient is 14.7 mmHg. DI= .18.  Mild to moderate (1-2+) mitral regurgitation.   5.The study quality is average       Review of  Systems   Cardiovascular:  Negative for chest pain, irregular heartbeat, leg swelling and palpitations.   Respiratory:  Negative for shortness of breath.    Genitourinary: Negative.    Neurological: Negative.    All other systems reviewed and are negative.    Objective:     Vital Signs (Most Recent):  Temp: 97.5 °F (36.4 °C) (07/22/24 1453)  Pulse: 92 (07/22/24 1453)  Resp: 18 (07/22/24 1453)  BP: 118/73 (07/22/24 1453)  SpO2: (!) 92 % (07/22/24 1453) Vital Signs (24h Range):  Temp:  [97 °F (36.1 °C)-99 °F (37.2 °C)] 97.5 °F (36.4 °C)  Pulse:  [] 92  Resp:  [15-27] 18  SpO2:  [92 %-100 %] 92 %  BP: ()/(60-92) 118/73   Weight: 92 kg (202 lb 12.8 oz)  Body mass index is 31.76 kg/m².  SpO2: (!) 92 %       Intake/Output Summary (Last 24 hours) at 7/22/2024 1505  Last data filed at 7/22/2024 1410  Gross per 24 hour   Intake 940 ml   Output --   Net 940 ml     Lines/Drains/Airways       Peripheral Intravenous Line  Duration                  Peripheral IV - Single Lumen 07/17/24 1600 20 G Right Antecubital 4 days         Peripheral IV - Single Lumen 07/22/24 1048 20 G Left;Posterior Hand <1 day                    Significant Labs:   Chemistries:   Recent Labs   Lab 07/17/24  1637 07/17/24  2046 07/18/24  0406 07/18/24  1308 07/18/24  1807 07/19/24  0358 07/20/24  0416 07/21/24  0518 07/22/24  0406     --  141  --   --  139 137 140 139   K 4.0  --  5.2* 4.8  --  5.4* 4.2 4.3 4.8     --  104  --   --  105 105 104 103   CO2 21*  --  26  --   --  22* 22* 28 24   BUN 20.3  --  20.9  --   --  22.7 23.5 21.7 26.5*   CREATININE 1.59*  --  1.51*  --   --  1.51* 1.35* 1.50* 1.61*   CALCIUM 9.2  --  9.6  --   --  9.9 9.1 9.5 9.7   BILITOT 0.5  --  1.0  --   --  1.3  --  1.9* 1.6*   ALKPHOS 66  --  71  --   --  80  --  73 73   ALT 22  --  22  --   --  23  --  20 21   AST 24  --  22  --   --  20  --  20 18   GLUCOSE 99  --  93  --   --  112 150* 103 167*   MG 2.10  --  2.30 2.30  --  2.40 2.30  --   --     TROPONINI 2.561* 1.834* 2.359* 2.751* 3.037*  --   --   --   --         CBC/Anemia Labs: Coags:    Recent Labs   Lab 07/20/24  0416 07/21/24  0518 07/22/24  0406   WBC 10.00 9.81 8.43   HGB 14.9 15.4 15.8   HCT 48.0 49.7 50.9    328 376   MCV 84.1 85.7 84.4   RDW 15.6 15.9 16.1    Recent Labs   Lab 07/17/24  1637 07/22/24  0406   INR 1.0  --    APTT 27.6 33.6        Telemetry:  AFIB CVR    Physical Exam  Vitals and nursing note reviewed.   HENT:      Head: Normocephalic.      Nose: Nose normal.      Mouth/Throat:      Mouth: Mucous membranes are moist.   Eyes:      Pupils: Pupils are equal, round, and reactive to light.   Cardiovascular:      Rate and Rhythm: Normal rate. Rhythm irregular.      Pulses: Normal pulses.      Heart sounds: Murmur heard.   Pulmonary:      Effort: Pulmonary effort is normal.   Abdominal:      General: Bowel sounds are normal.      Palpations: Abdomen is soft.   Musculoskeletal:         General: Normal range of motion.      Cervical back: Normal range of motion.   Skin:     General: Skin is warm.      Comments: Rt groin TAVR incision site ANOOP, well-approximated without bleeding or hematoma   Neurological:      Mental Status: He is alert and oriented to person, place, and time.   Psychiatric:         Mood and Affect: Mood normal.         Behavior: Behavior normal.       Current Schedule Inpatient Medications:   aspirin  81 mg Oral Daily    atorvastatin  40 mg Oral Daily    azelastine  2 spray Nasal BID    cefUROXime (ZINACEF) IVPB  1.5 g Intravenous Once    clopidogreL  75 mg Oral Daily    digoxin  0.125 mg Oral Daily    empagliflozin  10 mg Oral Daily    ezetimibe  10 mg Oral Daily    furosemide  20 mg Oral Daily    glipiZIDE  10 mg Oral Daily    guaiFENesin  1,200 mg Oral BID    metoprolol succinate  50 mg Oral Daily    vitamin D  1,000 Units Oral Daily     Assessment:   Newly Diagnosed AFIB - currently CVR     -CHADsVasc--6  (9.7% Stroke Risk Per Year)     -Currently on FD  Lovenox  Acute HFrEF Exacerbation      -.9      -EF 20% per TTE 6.13.24  JORDY on CKD  NSTEMI likely Type II due to Afib RVR, Acute HF Exacerbation, and JORDY on CKD      -Trop 2.561, 1.834, 2.359  Hypothyroidism    - TSH 5.931   Severe Aortic Stenosis     -S/P TAVR 7.22.24  CAD (Multi Vessel)      -s/p CABG and recent  lithotripsy using a 4 x 12 shockwave balloon, balloon angioplasty and stenting of the Left Main using a 5 x 12 REAGAN  Type II DM  HTN  No known history of GI bleed   **Iodine allergy**  Plan:   Continue Metoprolol succinate 50 mg daily  Continue digoxin 125 mcg po daily  Digoxin level on 7.23.24  Continue FD Lovenox for Stroke Risk Reduction with placement on DOAC prior to DC. Hold Lovenox for 24 hours post TAVR  Continue ASA, Statin, Plavix, Zetia  Refer to PCP for Thyroid work-up in the outpatient setting  EKG and Echo in am (routine TAVR procedures)  If patient remains in Flutter - Dr Rahman recommends DCCV  Keep Mag > 2 and Potassium > 4  Labs in AM: CBC, BMP, and Mag    Sarah Wong, VITOP  Cardiology  [facility] Tele[facility]    Physician addendum:  I have seen and examined this patient as a split-shared visit with the MAULIK d/t complicated medical management of above problems written in assessment and high acuity requiring physician expertise in medical decision-making. I performed the substantive portion of the history and exam. Above medical decision-making is also formulated by me.    Plan:  Resting comfortably - groin site looks great. In rate controlled 2:1 flutter. Will consider DCCV tomorrow vs med rx    Jr Montez MD [facility]  Int. Cardiologist

## 2024-07-22 NOTE — INTERVAL H&P NOTE
Patient name: Cole Oakley  MRN: [id number]  : 1950  Cath Lab Procedure H&P Update    Pre-Procedure Assessment:      Chart reviewed.  [facility] fims, echo images, and CT images reviewed.    Patient appears to have heavily calcified Aortic valve with low gradient AS.  Mean gradient was in the mid 30's prior to drop in EF from 45% earlier this year.  PAtient has residual RCA , but this should not need revasc before TAVR.    Plan  [place] 26mm S3 + 1 cc via Right TF approach  Left groin for pigtail  Can consider revasc of RCA  at later date if patient remains with depressed EF or remains symptomatic post TAVR.  Set up 2 transducers for valve study pre and post TAVR.

## 2024-07-22 NOTE — AI DETERIORATION ALERT
Artificial Intelligence Notification  [facility]  [address]  [place] [address]  Phone: [phone]    This documentation was triggered by an Artificial Intelligence Notification:    Admit Date: 2024   LOS: 5  Code Status: Full Code  : 1950  Age: 73 y.o.  Weight:   Wt Readings from Last 1 Encounters:   24 92 kg (202 lb 12.8 oz)        Sex: male  Bed: [address] [place] [address]  MRN: [id number]  Attending Physician: Isaiah Mckeon MD     Date of Alert: 2024  Time AI Alert Received: 1033             Vitals:    24 1110   BP: 102/66   Pulse: 92   Resp: 20   Temp: 97.4 °F (36.3 °C)     SpO2: 98 %      Artificial Intelligence alert discussed with Provider:     Name:     Date/Time of Provider Notification:        Patient Condition: patient stable. Post TAVR. VSS. No further ICU intervention at this time. ICU available if needed.

## 2024-07-22 NOTE — NURSING
Nurses Note -- 4 Eyes      7/22/2024   10:27 AM      Skin assessed during: Q Shift Change      [x] No Altered Skin Integrity Present    []Prevention Measures Documented      [] Yes- Altered Skin Integrity Present or Discovered   [] LDA Added if Not in Epic (Describe Wound)   [] New Altered Skin Integrity was Present on Admit and Documented in LDA   [] Wound Image Taken    Wound Care Consulted? No    Attending Nurse:  DAI Noriega     Second RN/Staff Member:   Aramis PATTON RN

## 2024-07-23 ENCOUNTER — ANESTHESIA (OUTPATIENT)
Dept: CARDIOLOGY | Facility: HOSPITAL | Age: 74
End: 2024-07-23
Payer: OTHER GOVERNMENT

## 2024-07-23 ENCOUNTER — ANESTHESIA EVENT (OUTPATIENT)
Dept: CARDIOLOGY | Facility: HOSPITAL | Age: 74
End: 2024-07-23
Payer: OTHER GOVERNMENT

## 2024-07-23 LAB
ANION GAP SERPL CALC-SCNC: 10 MEQ/L
APICAL FOUR CHAMBER EJECTION FRACTION: 29 %
APICAL TWO CHAMBER EJECTION FRACTION: 34 %
AV INDEX (PROSTH): 0.12
AV MEAN GRADIENT: 23 MMHG
AV PEAK GRADIENT: 38 MMHG
AV VALVE AREA BY VELOCITY RATIO: 0.71 CM²
AV VALVE AREA: 0.53 CM²
AV VELOCITY RATIO: 0.16
BASOPHILS # BLD AUTO: 0.03 X10(3)/MCL
BASOPHILS NFR BLD AUTO: 0.2 %
BSA FOR ECHO PROCEDURE: 2.09 M2
BUN SERPL-MCNC: 29.9 MG/DL (ref 8.4–25.7)
CALCIUM SERPL-MCNC: 9.5 MG/DL (ref 8.8–10)
CHLORIDE SERPL-SCNC: 105 MMOL/L (ref 98–107)
CO2 SERPL-SCNC: 22 MMOL/L (ref 23–31)
CREAT SERPL-MCNC: 1.52 MG/DL (ref 0.73–1.18)
CREAT/UREA NIT SERPL: 20
CV ECHO LV RWT: 0.52 CM
DIGOXIN SERPL-MCNC: 0.9 NG/ML (ref 0.8–2)
DOP CALC AO PEAK VEL: 3.1 M/S
DOP CALC AO VTI: 59.7 CM
DOP CALC LVOT AREA: 4.5 CM2
DOP CALC LVOT DIAMETER: 2.4 CM
DOP CALC LVOT PEAK VEL: 0.49 M/S
DOP CALC LVOT STROKE VOLUME: 31.65 CM3
DOP CALC MV VTI: 14.1 CM
DOP CALCLVOT PEAK VEL VTI: 7 CM
ECHO LV POSTERIOR WALL: 1.25 CM (ref 0.6–1.1)
EOSINOPHIL # BLD AUTO: 0 X10(3)/MCL (ref 0–0.9)
EOSINOPHIL NFR BLD AUTO: 0 %
ERYTHROCYTE [DISTWIDTH] IN BLOOD BY AUTOMATED COUNT: 15.6 % (ref 11.5–17)
FRACTIONAL SHORTENING: 10 % (ref 28–44)
GFR SERPLBLD CREATININE-BSD FMLA CKD-EPI: 47 ML/MIN/1.73/M2
GLUCOSE SERPL-MCNC: 136 MG/DL (ref 82–115)
HCT VFR BLD AUTO: 49.8 % (ref 42–52)
HGB BLD-MCNC: 15.3 G/DL (ref 14–18)
IMM GRANULOCYTES # BLD AUTO: 0.14 X10(3)/MCL (ref 0–0.04)
IMM GRANULOCYTES NFR BLD AUTO: 0.8 %
INTERVENTRICULAR SEPTUM: 1.61 CM (ref 0.6–1.1)
LEFT ATRIUM AREA SYSTOLIC (APICAL 2 CHAMBER): 32.9 CM2
LEFT ATRIUM AREA SYSTOLIC (APICAL 4 CHAMBER): 28.9 CM2
LEFT ATRIUM SIZE: 5.7 CM
LEFT ATRIUM VOLUME INDEX MOD: 55.2 ML/M2
LEFT ATRIUM VOLUME MOD: 112 CM3
LEFT INTERNAL DIMENSION IN SYSTOLE: 4.36 CM (ref 2.1–4)
LEFT VENTRICLE DIASTOLIC VOLUME INDEX: 54 ML/M2
LEFT VENTRICLE DIASTOLIC VOLUME: 109.62 ML
LEFT VENTRICLE END DIASTOLIC VOLUME APICAL 2 CHAMBER: 163 ML
LEFT VENTRICLE END DIASTOLIC VOLUME APICAL 4 CHAMBER: 112 ML
LEFT VENTRICLE END SYSTOLIC VOLUME APICAL 2 CHAMBER: 128 ML
LEFT VENTRICLE END SYSTOLIC VOLUME APICAL 4 CHAMBER: 97.9 ML
LEFT VENTRICLE MASS INDEX: 141 G/M2
LEFT VENTRICLE SYSTOLIC VOLUME INDEX: 42.3 ML/M2
LEFT VENTRICLE SYSTOLIC VOLUME: 85.82 ML
LEFT VENTRICULAR INTERNAL DIMENSION IN DIASTOLE: 4.84 CM (ref 3.5–6)
LEFT VENTRICULAR MASS: 286.1 G
LVED V (TEICH): 109.62 ML
LVES V (TEICH): 85.82 ML
LVOT MG: 1 MMHG
LVOT MV: 0.34 CM/S
LYMPHOCYTES # BLD AUTO: 1.3 X10(3)/MCL (ref 0.6–4.6)
LYMPHOCYTES NFR BLD AUTO: 7.2 %
MAGNESIUM SERPL-MCNC: 2.6 MG/DL (ref 1.6–2.6)
MCH RBC QN AUTO: 26.4 PG (ref 27–31)
MCHC RBC AUTO-ENTMCNC: 30.7 G/DL (ref 33–36)
MCV RBC AUTO: 86 FL (ref 80–94)
MONOCYTES # BLD AUTO: 1.53 X10(3)/MCL (ref 0.1–1.3)
MONOCYTES NFR BLD AUTO: 8.5 %
MV MEAN GRADIENT: 2 MMHG
MV PEAK GRADIENT: 6 MMHG
MV VALVE AREA BY CONTINUITY EQUATION: 2.24 CM2
NEUTROPHILS # BLD AUTO: 15.07 X10(3)/MCL (ref 2.1–9.2)
NEUTROPHILS NFR BLD AUTO: 83.3 %
NRBC BLD AUTO-RTO: 0 %
OHS CV RV/LV RATIO: 0.54 CM
OHS LV EJECTION FRACTION SIMPSONS BIPLANE MOD: 32 %
OHS QRS DURATION: 128 MS
OHS QRS DURATION: 130 MS
OHS QRS DURATION: 132 MS
OHS QRS DURATION: 136 MS
OHS QTC CALCULATION: 454 MS
OHS QTC CALCULATION: 489 MS
OHS QTC CALCULATION: 491 MS
OHS QTC CALCULATION: 510 MS
PISA MRMAX VEL: 3.11 M/S
PISA TR MAX VEL: 2.17 M/S
PLATELET # BLD AUTO: 307 X10(3)/MCL (ref 130–400)
PMV BLD AUTO: 10.1 FL (ref 7.4–10.4)
POTASSIUM SERPL-SCNC: 5 MMOL/L (ref 3.5–5.1)
PV PEAK GRADIENT: 5 MMHG
PV PEAK VELOCITY: 1.14 M/S
RBC # BLD AUTO: 5.79 X10(6)/MCL (ref 4.7–6.1)
RIGHT VENTRICULAR END-DIASTOLIC DIMENSION: 2.6 CM
SODIUM SERPL-SCNC: 137 MMOL/L (ref 136–145)
TDI LATERAL: 0.11 M/S
TDI SEPTAL: 0.05 M/S
TDI: 0.08 M/S
TR MAX PG: 19 MMHG
TRICUSPID ANNULAR PLANE SYSTOLIC EXCURSION: 1.07 CM
WBC # BLD AUTO: 18.07 X10(3)/MCL (ref 4.5–11.5)
Z-SCORE OF LEFT VENTRICULAR DIMENSION IN END DIASTOLE: -2.17
Z-SCORE OF LEFT VENTRICULAR DIMENSION IN END SYSTOLE: 1.32

## 2024-07-23 PROCEDURE — 80048 BASIC METABOLIC PNL TOTAL CA: CPT | Performed by: INTERNAL MEDICINE

## 2024-07-23 PROCEDURE — 93010 ELECTROCARDIOGRAM REPORT: CPT | Mod: 76,,, | Performed by: INTERNAL MEDICINE

## 2024-07-23 PROCEDURE — 93005 ELECTROCARDIOGRAM TRACING: CPT

## 2024-07-23 PROCEDURE — 93010 ELECTROCARDIOGRAM REPORT: CPT | Mod: ,,, | Performed by: INTERNAL MEDICINE

## 2024-07-23 PROCEDURE — 25000003 PHARM REV CODE 250: Performed by: PHYSICIAN ASSISTANT

## 2024-07-23 PROCEDURE — 25000003 PHARM REV CODE 250: Performed by: NURSE PRACTITIONER

## 2024-07-23 PROCEDURE — 25000003 PHARM REV CODE 250: Performed by: NURSE ANESTHETIST, CERTIFIED REGISTERED

## 2024-07-23 PROCEDURE — 36415 COLL VENOUS BLD VENIPUNCTURE: CPT | Performed by: INTERNAL MEDICINE

## 2024-07-23 PROCEDURE — 5A2204Z RESTORATION OF CARDIAC RHYTHM, SINGLE: ICD-10-PCS | Performed by: INTERNAL MEDICINE

## 2024-07-23 PROCEDURE — 83735 ASSAY OF MAGNESIUM: CPT | Performed by: NURSE PRACTITIONER

## 2024-07-23 PROCEDURE — 21400001 HC TELEMETRY ROOM

## 2024-07-23 PROCEDURE — 25000242 PHARM REV CODE 250 ALT 637 W/ HCPCS: Performed by: PHYSICIAN ASSISTANT

## 2024-07-23 PROCEDURE — 63600175 PHARM REV CODE 636 W HCPCS: Performed by: NURSE ANESTHETIST, CERTIFIED REGISTERED

## 2024-07-23 PROCEDURE — 85025 COMPLETE CBC W/AUTO DIFF WBC: CPT | Performed by: INTERNAL MEDICINE

## 2024-07-23 RX ORDER — PHENYLEPHRINE HYDROCHLORIDE 10 MG/ML
INJECTION INTRAVENOUS
Status: DISCONTINUED | OUTPATIENT
Start: 2024-07-23 | End: 2024-07-23

## 2024-07-23 RX ORDER — PROPOFOL 10 MG/ML
VIAL (ML) INTRAVENOUS
Status: DISCONTINUED | OUTPATIENT
Start: 2024-07-23 | End: 2024-07-23

## 2024-07-23 RX ADMIN — SODIUM CHLORIDE, SODIUM GLUCONATE, SODIUM ACETATE, POTASSIUM CHLORIDE AND MAGNESIUM CHLORIDE: 526; 502; 368; 37; 30 INJECTION, SOLUTION INTRAVENOUS at 02:07

## 2024-07-23 RX ADMIN — EZETIMIBE 10 MG: 10 TABLET ORAL at 08:07

## 2024-07-23 RX ADMIN — GUAIFENESIN 1200 MG: 600 TABLET, EXTENDED RELEASE ORAL at 08:07

## 2024-07-23 RX ADMIN — METOPROLOL SUCCINATE 50 MG: 50 TABLET, EXTENDED RELEASE ORAL at 08:07

## 2024-07-23 RX ADMIN — EMPAGLIFLOZIN 10 MG: 10 TABLET, FILM COATED ORAL at 08:07

## 2024-07-23 RX ADMIN — CLOPIDOGREL BISULFATE 75 MG: 75 TABLET ORAL at 08:07

## 2024-07-23 RX ADMIN — PHENYLEPHRINE HYDROCHLORIDE 50 MCG: 10 INJECTION INTRAVENOUS at 02:07

## 2024-07-23 RX ADMIN — PROPOFOL 20 MG: 10 INJECTION, EMULSION INTRAVENOUS at 02:07

## 2024-07-23 RX ADMIN — ATORVASTATIN CALCIUM 40 MG: 40 TABLET, FILM COATED ORAL at 08:07

## 2024-07-23 RX ADMIN — Medication 1000 UNITS: at 08:07

## 2024-07-23 RX ADMIN — FUROSEMIDE 20 MG: 20 TABLET ORAL at 08:07

## 2024-07-23 RX ADMIN — PROPOFOL 70 MG: 10 INJECTION, EMULSION INTRAVENOUS at 02:07

## 2024-07-23 RX ADMIN — DIGOXIN 0.12 MG: 125 TABLET ORAL at 08:07

## 2024-07-23 RX ADMIN — GLIPIZIDE 10 MG: 10 TABLET ORAL at 08:07

## 2024-07-23 RX ADMIN — ASPIRIN 81 MG: 81 TABLET, COATED ORAL at 08:07

## 2024-07-23 NOTE — NURSING
Nurses Note -- 4 Eyes      7/23/2024   1:37 PM      Skin assessed during: Q Shift Change      [x] No Altered Skin Integrity Present    [x]Prevention Measures Documented      [] Yes- Altered Skin Integrity Present or Discovered   [] LDA Added if Not in Epic (Describe Wound)   [] New Altered Skin Integrity was Present on Admit and Documented in LDA   [] Wound Image Taken    Wound Care Consulted? No    Attending Nurse:  DAI Serrato    Second RN/Staff Member:  DAI Self

## 2024-07-23 NOTE — PROGRESS NOTES
Inpatient Nutrition Evaluation    Admit Date: 7/17/2024   Total duration of encounter: 6 days   Patient Age: 73 y.o.    Nutrition Recommendation/Prescription     Resume Heart Healthy, 1800 kcal Diabetic Diet as appropriate  Add Chocolate Boost Glucose Control BID (250 kcals and 14 g Pro per serving)      Nutrition Assessment     Chart Review    Reason Seen: length of stay    Malnutrition Screening Tool Results   Have you recently lost weight without trying?: No  Have you been eating poorly because of a decreased appetite?: No   MST Score: 0   Diagnosis:  New onset Afib RVR  Acute HFrEF exacerbation  JORDY on CKD  NSTEMI    Relevant Medical History:   CAD s/p CABG & PCI, NSTEMI, HFrEF, HTN, Type II DM, and severe AS     Scheduled Medications:  aspirin, 81 mg, Daily  atorvastatin, 40 mg, Daily  azelastine, 2 spray, BID  clopidogreL, 75 mg, Daily  digoxin, 0.125 mg, Daily  empagliflozin, 10 mg, Daily  ezetimibe, 10 mg, Daily  furosemide, 20 mg, Daily  glipiZIDE, 10 mg, Daily  guaiFENesin, 1,200 mg, BID  metoprolol succinate, 50 mg, Daily  vitamin D, 1,000 Units, Daily    Continuous Infusions:   PRN Medications:   Current Facility-Administered Medications:     0.9%  NaCl infusion (for blood administration), , Intravenous, Q24H PRN    acetaminophen, 650 mg, Oral, Q4H PRN    albuterol, 2 puff, Inhalation, Q4H PRN    cefUROXime (ZINACEF) IVPB, 1.5 g, Intravenous, PRN    dextrose 10%, 12.5 g, Intravenous, PRN    dextrose 10%, 25 g, Intravenous, PRN    hydrALAZINE, 10 mg, Intravenous, Q4H PRN    HYDROcodone-acetaminophen, 1 tablet, Oral, Q4H PRN    melatonin, 6 mg, Oral, Nightly PRN    morphine, 2 mg, Intravenous, Q1H PRN    ondansetron, 4 mg, Intravenous, Q6H PRN    sodium chloride, 1 spray, Each Nostril, PRN    sodium chloride 0.9%, 10 mL, Intravenous, PRN    Recent Labs   Lab 07/17/24  1637 07/18/24  0406 07/18/24  1308 07/19/24  0358 07/20/24  0416 07/21/24  0518 07/22/24  0406 07/23/24  0447    141  --  139 137 140  "139 137   K 4.0 5.2* 4.8 5.4* 4.2 4.3 4.8 5.0   CALCIUM 9.2 9.6  --  9.9 9.1 9.5 9.7 9.5   MG 2.10 2.30 2.30 2.40 2.30  --   --  2.60   CO2 21* 26  --  22* 22* 28 24 22*   BUN 20.3 20.9  --  22.7 23.5 21.7 26.5* 29.9*   CREATININE 1.59* 1.51*  --  1.51* 1.35* 1.50* 1.61* 1.52*   EGFRNORACEVR 45 48  --  48 55 48 44 47   GLUCOSE 99 93  --  112 150* 103 167* 136*   BILITOT 0.5 1.0  --  1.3  --  1.9* 1.6*  --    ALKPHOS 66 71  --  80  --  73 73  --    ALT 22 22  --  23  --  20 21  --    AST 24 22  --  20  --  20 18  --    ALBUMIN 3.4 3.4  --  3.5  --  3.3* 3.4  --    WBC 12.06* 9.53  --  8.88 10.00 9.81 8.43 18.07*   HGB 15.7 15.2  --  15.8 14.9 15.4 15.8 15.3   HCT 51.8 49.9  --  50.8 48.0 49.7 50.9 49.8     Nutrition Orders:  Diet NPO Except for: Sips with Medication      Appetite/Oral Intake: fair/50-75% of meals  Factors Affecting Nutritional Intake: decreased appetite  Food/Episcopalian/Cultural Preferences: none reported  Food Allergies: no known food allergies  Last Bowel Movement: 07/23/24  Wound(s):  No partial or full thickness pressure injuries documented    Comments    7/23/24: Pt reports fair appetite, states it fluctuates from day to day. Pt denies any unintended wt lss PTA, states his wt ranges from 190 to 210 lbs. Pt agrees to trying ONS for additional nourishment.    Anthropometrics    Height: 5' 7" (170.2 cm),    Last Weight: 92 kg (202 lb 12.8 oz) (07/19/24 1525), Weight Method: Standard Scale  BMI (Calculated): 31.8  BMI Classification: obese grade I (BMI 30-34.9)        Ideal Body Weight (IBW), Male: 148 lb     % Ideal Body Weight, Male (lb): 137.03 %                          Usual Weight Provided By: patient    Wt Readings from Last 5 Encounters:   07/19/24 92 kg (202 lb 12.8 oz)   05/09/24 91.6 kg (202 lb)   08/07/23 98.9 kg (218 lb)     Weight Change(s) Since Admission:   Wt Readings from Last 1 Encounters:   07/19/24 1525 92 kg (202 lb 12.8 oz)   07/17/24 2222 93.9 kg (207 lb)   07/17/24 1540 93.9 " kg (207 lb)   Admit Weight: 93.9 kg (207 lb) (07/17/24 1540), Weight Method: Standard Scale    Patient Education     Not applicable.    Nutrition Goals & Monitoring     Dietitian will monitor: energy intake, weight, and glucose/endocrine profile    Nutrition Risk/Follow-Up: low (follow-up in 5-7 days)  Patients assigned 'low nutrition risk' status do not qualify for a full nutritional assessment but will be monitored and re-evaluated in a 5-7 day time period. Please consult if re-evaluation needed sooner.

## 2024-07-23 NOTE — ANESTHESIA POSTPROCEDURE EVALUATION
Anesthesia Post Evaluation    Patient: Cole Oakley    Procedure(s) Performed: * No procedures listed *    Final Anesthesia Type: general      Patient location during evaluation: PACU  Patient participation: Yes- Able to Participate  Level of consciousness: awake and alert  Post-procedure vital signs: reviewed and stable  Pain management: adequate  Airway patency: patent  KATELYN mitigation strategies: Multimodal analgesia  PONV status at discharge: No PONV  Anesthetic complications: no      Cardiovascular status: hemodynamically stable  Respiratory status: unassisted  Hydration status: euvolemic  Follow-up not needed.              Vitals Value Taken Time   /72 07/23/24 1500   Temp 36.6 °C (97.8 °F) 07/23/24 1500   Pulse 79 07/23/24 1500   Resp  07/23/24 1504   SpO2  07/23/24 1504         No case tracking events are documented in the log.      Pain/Vik Score: Vik Score: 10 (7/23/2024  2:14 PM)

## 2024-07-23 NOTE — NURSING
Nurses Note -- 4 Eyes      7/22/2024   11:18 PM      Skin assessed during: Q Shift Change      [x] No Altered Skin Integrity Present    [x]Prevention Measures Documented      [] Yes- Altered Skin Integrity Present or Discovered   [] LDA Added if Not in Epic (Describe Wound)   [] New Altered Skin Integrity was Present on Admit and Documented in LDA   [] Wound Image Taken    Wound Care Consulted? No    Attending Nurse:  Geoffrey Walsh RN    Second RN/Staff Member:  Leobardo Chapman LPN

## 2024-07-23 NOTE — OP NOTE
[facility] - [facility] Medical Telemetry  Cardiothoracic Surgery  Operative Note    SUMMARY     Date of Procedure: 7/22/2024     Procedure:  1.  Transcatheter aortic valve replacement using a 26 mm [place] 3 ultra bioprosthesis with +1 mL  2. Closure of the right common femoral artery with an 18 [place]ian Manta closure device     Co-Surgeon: JERI Peres / Josiah    Assistant: Gilson Bowling    Pre-Operative Diagnosis:  1.  Acute and chronic combined systolic and diastolic congestive heart failure   2. Severe aortic stenosis  3.  Obesity    Post-Operative Diagnosis:  Same    Anesthesia: Mac    Operative Findings (including complications, if any):  Dictated    Description of Technical Procedures:Patient was delivered to the cath lab, and prepped and draped in the usual sterile fashion.  Bilateral common femoral artery access was obtained with micropuncture access kit and ultrasound guidance.  Catheters and wires were then directed to the aortic root under fluoroscopic guidance.  A pigtail catheter was placed in the appropriate coronary cusp and a diagnostic aortogram was performed.  The working sheath was then placed in the femoral artery and An AL-1 guide was then passed over a J-wire and positioned at the outflow for the aortic valve.  Straight wire was then passed across the aortic valve and the catheter was then advanced into the left ventricle.  The stiff wire was then placed into the ventricle under fluoroscopic guidance and positioned appropriately.  The transcatheter aortic valve was device was then prepared and passed over the wire into the aortic valve annulus.  Under rapid ventricular pacing the valve was deployed without incident.  The delivery system was removed.  Completion aortography was performed.  The pigtail catheter was then pulled to the aortic bifurcation.  The large working sheath was then removed and a Manta closure device was then deployed in the usual fashion.   Completion aortography confirmed there to be no extravasation from the femoral artery.  Sterile dressings were applied.  The patient tolerated the procedure well will be delivered to the recovery room in stable condition.   Estimated Blood Loss (EBL):  20 mL          Specimens:   Specimen (24h ago, onward)      None                    Condition: Stable    Disposition: PACU - hemodynamically stable.

## 2024-07-23 NOTE — PROGRESS NOTES
"[facility] - [facility] Medical Telemetry    Cardiology  Progress Note    Patient Name: Cole Oakley  MRN: [id number]  Admission Date: 7/17/2024  Hospital Length of Stay: 6 days  Code Status: Full Code   Attending Physician: Isaiah Mckeon MD   Primary Care Physician: Dina Scott MD  Expected Discharge Date:   Principal Problem:<principal problem not specified>    Subjective:     Brief HPI/Hospital Course: Patient is a 73 y.o. male known to Dr. St, with a pmh of CAD s/p CABG & PCI, NSTEMI, HFrEF, HTN, Type II DM, and severe AS. He was sent to [facility] ER from Dr. St's office for new onset Afib.  He presented to Dr. St's office on 7.17.24 for a hospital follow up after [place] [facility] [place] and had intravascular lithotripsy using a 4 x 12 shockwave balloon, balloon angioplasty and stenting of the Left Main using a 5 x 12 REAGAN. Upon arrival to the clinic the patient c/o being fatigued and was found to be in Atrial Fibrillation with RVR.  Upon arrival to the ER labs significant for WBC 12.06, Creatinine 1.59, .9, and Troponin 1.834. EKG revealed A Flutter with variable AV block with PVCs.  CXR without acute findings.  The patient was admitted to [facility] for evaluation and treatment of Afib.    7.19.24: NAD. VSS. AFIB CVR. Sitting in the chair. No complaints of CP/SOB/Palps. "I feel okay" Creat 1.51, K 5.4  7.20.24: Patient reporting SOB and "retaining fluid" despite oral lasix. Remains Afib, RVR. BP marginal   7.21.24: Patient sitting up at bedside. NAD. He reports feeling better with additional dose of lasix yesterday but is still carrying extra fluid. Renal indices mildly bumped  7.22.24: Pt post TAVR still in bed rest. NAD.  States "I already feel so much better."  A Flutter on tele with CVR.  VSS.    7.23.24:  Sitting in Chair. NAD.  VSS.  Aflutter with CVR.  Plan for ABRAHAM/DCCV this afternoon.  Pt agrees with procedure.  Denies c/o         PMH:  CAD s/p " CABG and recent PCI, Severe Aortic Stenosis, HFrEF, Type II DM, HTN  PSH:  CABG, Lithotripsy & REAGAN to L Main, Prostate sx, Bilateral Hip Arthroplasty, Catartact extraction  Family History: Mother & Father with H/O CAD  Social History: Denies use of tobacco, ETOH, or illicit drugs      Previous Cardiac Diagnostics:   [facility] (7.10.24):  mLM 70-80% calcified stenosis on IV status post intravascular lithotripsy using shockwave balloon and stenting  Lcx mild CAD  MLAD occlusion  SVG to LAD patent at the origin body and site of insertion fills the LAD well in antegrade and retrograde fashion.  Vein graft to OM patent fills the superior branch of OM antegrade and back fills the inferior branch of OM retrograde  RCA occluded in the midportion of the distal PDA and PLB the fills via left-to-right collaterals  Pullback from LV to aorta there was a 20 mm gradient across the valve  Plan  Severe left main stenosis which supplies circumflex artery status post balloon angioplasty and stenting using a 5 x 12 REAGAN  Severe AS likely low-flow low gradient AS will proceed with TAVR workup    Carotid US (7.3.24)  The study quality is average.  1-39% stenosis in the proximal right internal carotid artery based on Bluth Criteria.   1-39% stenosis in the proximal left internal carotid artery based on Bluth Criteria.  The right vertebral artery is poorly visualized.   5.Antegrade left vertebral artery flow.     TTE (6.13.24):  The left ventricle is normal in size. Global left ventricular systolic function is severely decreased. The left ventricular ejection fraction is 20%. Noted left ventricular hypertrophy. Concentric left ventricular hypertrophy is present. It is mild. LVSI= 13.2 ml/m2.The left atrial diameter is mildly increased.  Suspect severe aortic valve stenosis is present- Low flow low gradient Severe Aortic valve stenosis . Aortic valve area continuity equation is 0.5 cm². The trans-aortic peak velocity is 2.7 m/s. The trans-aortic  mean gradient is 14.7 mmHg. DI= .18.  Mild to moderate (1-2+) mitral regurgitation.   5.The study quality is average       Review of Systems   Constitutional: Negative.   Cardiovascular:  Negative for chest pain, irregular heartbeat, leg swelling and palpitations.   Respiratory:  Negative for shortness of breath.    Skin: Negative.    Genitourinary: Negative.    Neurological: Negative.    All other systems reviewed and are negative.    Objective:     Vital Signs (Most Recent):  Temp: 99 °F (37.2 °C) (07/23/24 0718)  Pulse: (!) 121 (07/23/24 0718)  Resp: 18 (07/23/24 0718)  BP: 110/73 (07/23/24 0718)  SpO2: 96 % (07/23/24 0718) Vital Signs (24h Range):  Temp:  [97 °F (36.1 °C)-99 °F (37.2 °C)] 99 °F (37.2 °C)  Pulse:  [] 121  Resp:  [15-27] 18  SpO2:  [92 %-100 %] 96 %  BP: ()/(66-92) 110/73   Weight: 92 kg (202 lb 12.8 oz)  Body mass index is 31.76 kg/m².  SpO2: 96 %       Intake/Output Summary (Last 24 hours) at 7/23/2024 0811  Last data filed at 7/22/2024 1410  Gross per 24 hour   Intake 840 ml   Output --   Net 840 ml     Lines/Drains/Airways       Peripheral Intravenous Line  Duration                  Peripheral IV - Single Lumen 07/17/24 1600 20 G Right Antecubital 5 days         Peripheral IV - Single Lumen 07/22/24 1048 20 G Left;Posterior Hand <1 day                    Significant Labs:   Chemistries:   Recent Labs   Lab 07/17/24  1637 07/17/24  2046 07/18/24  0406 07/18/24  1308 07/18/24  1807 07/19/24  0358 07/20/24  0416 07/21/24  0518 07/22/24  0406 07/23/24  0447     --  141  --   --  139 137 140 139 137   K 4.0  --  5.2* 4.8  --  5.4* 4.2 4.3 4.8 5.0     --  104  --   --  105 105 104 103 105   CO2 21*  --  26  --   --  22* 22* 28 24 22*   BUN 20.3  --  20.9  --   --  22.7 23.5 21.7 26.5* 29.9*   CREATININE 1.59*  --  1.51*  --   --  1.51* 1.35* 1.50* 1.61* 1.52*   CALCIUM 9.2  --  9.6  --   --  9.9 9.1 9.5 9.7 9.5   BILITOT 0.5  --  1.0  --   --  1.3  --  1.9* 1.6*  --     ALKPHOS 66  --  71  --   --  80  --  73 73  --    ALT 22  --  22  --   --  23  --  20 21  --    AST 24  --  22  --   --  20  --  20 18  --    GLUCOSE 99  --  93  --   --  112 150* 103 167* 136*   MG 2.10  --  2.30 2.30  --  2.40 2.30  --   --  2.60   TROPONINI 2.561* 1.834* 2.359* 2.751* 3.037*  --   --   --   --   --         CBC/Anemia Labs: Coags:    Recent Labs   Lab 07/21/24  0518 07/22/24  0406 07/23/24  0447   WBC 9.81 8.43 18.07*   HGB 15.4 15.8 15.3   HCT 49.7 50.9 49.8    376 307   MCV 85.7 84.4 86.0   RDW 15.9 16.1 15.6    Recent Labs   Lab 07/17/24  1637 07/22/24  0406   INR 1.0  --    APTT 27.6 33.6        Telemetry:  AFIB CVR    Physical Exam  Vitals and nursing note reviewed.   HENT:      Head: Normocephalic.      Nose: Nose normal.      Mouth/Throat:      Mouth: Mucous membranes are moist.   Eyes:      Pupils: Pupils are equal, round, and reactive to light.   Cardiovascular:      Rate and Rhythm: Normal rate. Rhythm irregular.      Pulses: Normal pulses.      Heart sounds: Murmur heard.   Pulmonary:      Effort: Pulmonary effort is normal.   Abdominal:      General: Bowel sounds are normal.      Palpations: Abdomen is soft.   Musculoskeletal:         General: Normal range of motion.      Cervical back: Normal range of motion.   Skin:     General: Skin is warm and dry.      Comments: Rt and Left groin TAVR incision site ANOOP, well-approximated without bleeding or hematoma   Neurological:      Mental Status: He is alert and oriented to person, place, and time.   Psychiatric:         Mood and Affect: Mood normal.         Behavior: Behavior normal.       Current Schedule Inpatient Medications:   aspirin  81 mg Oral Daily    atorvastatin  40 mg Oral Daily    azelastine  2 spray Nasal BID    clopidogreL  75 mg Oral Daily    digoxin  0.125 mg Oral Daily    empagliflozin  10 mg Oral Daily    ezetimibe  10 mg Oral Daily    furosemide  20 mg Oral Daily    glipiZIDE  10 mg Oral Daily    guaiFENesin   1,200 mg Oral BID    metoprolol succinate  50 mg Oral Daily    vitamin D  1,000 Units Oral Daily     Assessment:   Newly Diagnosed AFIB - currently A-Flutter CVR     -CHADsVasc--6  (9.7% Stroke Risk Per Year)     -Currently on FD Lovenox  Acute HFrEF Exacerbation (Compensated)      -.9  ICMO      -EF 20% per TTE 6.13.24  Severe Aortic Stenosis     -S/P TAVR 7.22.24  JORDY on CKD (Stable)  NSTEMI likely Type II due to Afib RVR, Acute HF Exacerbation, and JORDY on CKD      -Trop 2.561, 1.834, 2.359  Hypothyroidism    - TSH 5.931   CAD (Multi Vessel)      -s/p CABG and recent  lithotripsy using a 4 x 12 shockwave balloon, balloon angioplasty and stenting of the Left Main using a 5 x 12 REAGAN  Type II DM  HTN  No known history of GI bleed   **Iodine allergy**    Plan:   Continue Metoprolol succinate 50 mg daily  Continue digoxin 125 mcg po daily  Digoxin level on 7.22.24 --0.9  Resume FD Lovenox for Stroke Risk Reduction with placement on DOAC prior to DC.  ?? Triple Therapy -- Will hold until after ABRAHAM/DCCV today  Continue ASA, Statin, Plavix, Zetia  Refer to PCP for Thyroid work-up in the outpatient setting  EKG and Echo this am (routine TAVR procedures) (Echo Pending)       -If EF remains < 35 % post TAVR will need to address Lifevest with patient  Pt remains in Aflutter.  Plan for ABRAHAM w/ poss DCCV today at 2 PM (pt ate breakfast at 7.30 am)  Keep NPO until after procedure  Risk, Benefits and Alternatives Reviewed and Discussed with the PT and their Family and they wish to proceed with above Procedure.   Consents obtained and placed on chart  Keep Mag > 2 and Potassium > 4  Labs in AM: CBC, BMP, and Mag  EKG in am      DOC Ibrahim  Cardiology  [facility] - [facility] Medical Telemetry    Physician addendum:  I have seen and examined this patient as a split-shared visit with the MAULIK d/t complicated medical management of above problems written in assessment and high acuity requiring physician  expertise in medical decision-making. I performed the substantive portion of the history and exam. Above medical decision-making is also formulated by me.    Plan:  NPO - consider ABRAHAM/DCCV today or tomorrow AM. Will d/c after that. Groin looks great - patient feels good    Jr Montez MD [facility]  Int. Cardiologist

## 2024-07-23 NOTE — ANESTHESIA PREPROCEDURE EVALUATION
07/23/2024  Cole Oakley is a 73 y.o., male.  Atrial Flutter, s/p TAVR ytd    Pre-op Assessment    I have reviewed the Patient Summary Reports.     I have reviewed the Nursing Notes. I have reviewed the NPO Status.   I have reviewed the Medications.     Review of Systems  Cardiovascular:     Hypertension                                  Hypertension         Pulmonary:        Sleep Apnea     Obstructive Sleep Apnea (KATELYN).           Renal/:  Chronic Renal Disease        Kidney Function/Disease             Hepatic/GI:     GERD      Gerd          Endocrine:  Diabetes    Diabetes                          Physical Exam  General: Well nourished, Cooperative, Alert and Oriented    Airway:  Mallampati: III   Mouth Opening: Normal  TM Distance: Normal  Tongue: Normal  Neck ROM: Normal ROM    Dental:  Edentulous        Anesthesia Plan  Type of Anesthesia, risks & benefits discussed:    Anesthesia Type: Gen Natural Airway  Intra-op Monitoring Plan: Standard ASA Monitors  Post Op Pain Control Plan: multimodal analgesia  Induction:  IV  Informed Consent: Informed consent signed with the Patient and all parties understand the risks and agree with anesthesia plan.  All questions answered. Patient consented to blood products? Yes  ASA Score: 3  Day of Surgery Review of History & Physical: H&P Update referred to the surgeon/provider.    Ready For Surgery From Anesthesia Perspective.     .

## 2024-07-24 LAB
ABO + RH BLD: NORMAL
ABO + RH BLD: NORMAL
ALBUMIN SERPL-MCNC: 3.1 G/DL (ref 3.4–4.8)
ALBUMIN/GLOB SERPL: 0.9 RATIO (ref 1.1–2)
ALP SERPL-CCNC: 65 UNIT/L (ref 40–150)
ALT SERPL-CCNC: 17 UNIT/L (ref 0–55)
ANION GAP SERPL CALC-SCNC: 7 MEQ/L
AST SERPL-CCNC: 18 UNIT/L (ref 5–34)
BASOPHILS # BLD AUTO: 0.04 X10(3)/MCL
BASOPHILS NFR BLD AUTO: 0.3 %
BILIRUB SERPL-MCNC: 1.2 MG/DL
BLD PROD TYP BPU: NORMAL
BLD PROD TYP BPU: NORMAL
BLOOD UNIT EXPIRATION DATE: NORMAL
BLOOD UNIT EXPIRATION DATE: NORMAL
BLOOD UNIT TYPE CODE: 5100
BLOOD UNIT TYPE CODE: 5100
BUN SERPL-MCNC: 24.6 MG/DL (ref 8.4–25.7)
CALCIUM SERPL-MCNC: 9 MG/DL (ref 8.8–10)
CHLORIDE SERPL-SCNC: 102 MMOL/L (ref 98–107)
CO2 SERPL-SCNC: 27 MMOL/L (ref 23–31)
CREAT SERPL-MCNC: 1.39 MG/DL (ref 0.73–1.18)
CREAT/UREA NIT SERPL: 18
CROSSMATCH INTERPRETATION: NORMAL
CROSSMATCH INTERPRETATION: NORMAL
DISPENSE STATUS: NORMAL
DISPENSE STATUS: NORMAL
EOSINOPHIL # BLD AUTO: 0.03 X10(3)/MCL (ref 0–0.9)
EOSINOPHIL NFR BLD AUTO: 0.2 %
ERYTHROCYTE [DISTWIDTH] IN BLOOD BY AUTOMATED COUNT: 15.6 % (ref 11.5–17)
GFR SERPLBLD CREATININE-BSD FMLA CKD-EPI: 53 ML/MIN/1.73/M2
GLOBULIN SER-MCNC: 3.5 GM/DL (ref 2.4–3.5)
GLUCOSE SERPL-MCNC: 116 MG/DL (ref 82–115)
HCT VFR BLD AUTO: 47.2 % (ref 42–52)
HGB BLD-MCNC: 14.6 G/DL (ref 14–18)
IMM GRANULOCYTES # BLD AUTO: 0.12 X10(3)/MCL (ref 0–0.04)
IMM GRANULOCYTES NFR BLD AUTO: 0.9 %
LYMPHOCYTES # BLD AUTO: 1.11 X10(3)/MCL (ref 0.6–4.6)
LYMPHOCYTES NFR BLD AUTO: 8.2 %
MAGNESIUM SERPL-MCNC: 2.4 MG/DL (ref 1.6–2.6)
MCH RBC QN AUTO: 26.4 PG (ref 27–31)
MCHC RBC AUTO-ENTMCNC: 30.9 G/DL (ref 33–36)
MCV RBC AUTO: 85.5 FL (ref 80–94)
MONOCYTES # BLD AUTO: 1.39 X10(3)/MCL (ref 0.1–1.3)
MONOCYTES NFR BLD AUTO: 10.3 %
NEUTROPHILS # BLD AUTO: 10.81 X10(3)/MCL (ref 2.1–9.2)
NEUTROPHILS NFR BLD AUTO: 80.1 %
NRBC BLD AUTO-RTO: 0 %
OHS QRS DURATION: 130 MS
OHS QTC CALCULATION: 474 MS
PLATELET # BLD AUTO: 224 X10(3)/MCL (ref 130–400)
PMV BLD AUTO: 9.7 FL (ref 7.4–10.4)
POTASSIUM SERPL-SCNC: 4.4 MMOL/L (ref 3.5–5.1)
PROT SERPL-MCNC: 6.6 GM/DL (ref 5.8–7.6)
RBC # BLD AUTO: 5.52 X10(6)/MCL (ref 4.7–6.1)
SODIUM SERPL-SCNC: 136 MMOL/L (ref 136–145)
UNIT NUMBER: NORMAL
UNIT NUMBER: NORMAL
WBC # BLD AUTO: 13.5 X10(3)/MCL (ref 4.5–11.5)

## 2024-07-24 PROCEDURE — 94760 N-INVAS EAR/PLS OXIMETRY 1: CPT

## 2024-07-24 PROCEDURE — 27000221 HC OXYGEN, UP TO 24 HOURS

## 2024-07-24 PROCEDURE — 25000003 PHARM REV CODE 250: Performed by: NURSE PRACTITIONER

## 2024-07-24 PROCEDURE — 99900035 HC TECH TIME PER 15 MIN (STAT)

## 2024-07-24 PROCEDURE — 25000242 PHARM REV CODE 250 ALT 637 W/ HCPCS: Performed by: PHYSICIAN ASSISTANT

## 2024-07-24 PROCEDURE — 99900031 HC PATIENT EDUCATION (STAT)

## 2024-07-24 PROCEDURE — 80053 COMPREHEN METABOLIC PANEL: CPT | Performed by: NURSE PRACTITIONER

## 2024-07-24 PROCEDURE — 83735 ASSAY OF MAGNESIUM: CPT | Performed by: NURSE PRACTITIONER

## 2024-07-24 PROCEDURE — 25000003 PHARM REV CODE 250: Performed by: PHYSICIAN ASSISTANT

## 2024-07-24 PROCEDURE — 21400001 HC TELEMETRY ROOM

## 2024-07-24 PROCEDURE — 85025 COMPLETE CBC W/AUTO DIFF WBC: CPT | Performed by: NURSE PRACTITIONER

## 2024-07-24 PROCEDURE — 36415 COLL VENOUS BLD VENIPUNCTURE: CPT | Performed by: NURSE PRACTITIONER

## 2024-07-24 PROCEDURE — 63600175 PHARM REV CODE 636 W HCPCS: Performed by: NURSE PRACTITIONER

## 2024-07-24 RX ORDER — ENOXAPARIN SODIUM 100 MG/ML
1 INJECTION SUBCUTANEOUS EVERY 12 HOURS
Status: DISCONTINUED | OUTPATIENT
Start: 2024-07-24 | End: 2024-07-25

## 2024-07-24 RX ADMIN — AMIODARONE HYDROCHLORIDE 150 MG: 1.5 INJECTION, SOLUTION INTRAVENOUS at 09:07

## 2024-07-24 RX ADMIN — METOPROLOL SUCCINATE 50 MG: 50 TABLET, EXTENDED RELEASE ORAL at 08:07

## 2024-07-24 RX ADMIN — AMIODARONE HYDROCHLORIDE 0.5 MG/MIN: 1.8 INJECTION, SOLUTION INTRAVENOUS at 03:07

## 2024-07-24 RX ADMIN — EMPAGLIFLOZIN 10 MG: 10 TABLET, FILM COATED ORAL at 08:07

## 2024-07-24 RX ADMIN — EZETIMIBE 10 MG: 10 TABLET ORAL at 08:07

## 2024-07-24 RX ADMIN — ASPIRIN 81 MG: 81 TABLET, COATED ORAL at 08:07

## 2024-07-24 RX ADMIN — AMIODARONE HYDROCHLORIDE 0.5 MG/MIN: 1.8 INJECTION, SOLUTION INTRAVENOUS at 08:07

## 2024-07-24 RX ADMIN — DIGOXIN 0.12 MG: 125 TABLET ORAL at 08:07

## 2024-07-24 RX ADMIN — GLIPIZIDE 10 MG: 10 TABLET ORAL at 08:07

## 2024-07-24 RX ADMIN — ENOXAPARIN SODIUM 90 MG: 100 INJECTION SUBCUTANEOUS at 08:07

## 2024-07-24 RX ADMIN — CLOPIDOGREL BISULFATE 75 MG: 75 TABLET ORAL at 08:07

## 2024-07-24 RX ADMIN — GUAIFENESIN 1200 MG: 600 TABLET, EXTENDED RELEASE ORAL at 08:07

## 2024-07-24 RX ADMIN — AMIODARONE HYDROCHLORIDE 1 MG/MIN: 1.8 INJECTION, SOLUTION INTRAVENOUS at 09:07

## 2024-07-24 RX ADMIN — LOSARTAN POTASSIUM 12.5 MG: 25 TABLET, FILM COATED ORAL at 11:07

## 2024-07-24 RX ADMIN — ATORVASTATIN CALCIUM 40 MG: 40 TABLET, FILM COATED ORAL at 08:07

## 2024-07-24 RX ADMIN — FUROSEMIDE 20 MG: 20 TABLET ORAL at 08:07

## 2024-07-24 RX ADMIN — Medication 1000 UNITS: at 08:07

## 2024-07-24 RX ADMIN — ENOXAPARIN SODIUM 90 MG: 100 INJECTION SUBCUTANEOUS at 09:07

## 2024-07-24 NOTE — PROGRESS NOTES
"[facility] - [facility] Medical Telemetry    Cardiology  Progress Note    Patient Name: Cole Oakley  MRN: [id number]  Admission Date: 7/17/2024  Hospital Length of Stay: 7 days  Code Status: Full Code   Attending Physician: Isaiah Mckeon MD   Primary Care Physician: Dina Scott MD  Expected Discharge Date: 7/24/2024  Principal Problem:<principal problem not specified>    Subjective:     Brief HPI/Hospital Course: Patient is a 73 y.o. male known to Dr. St, with a pmh of CAD s/p CABG & PCI, NSTEMI, HFrEF, HTN, Type II DM, and severe AS. He was sent to [facility] ER from Dr. St's office for new onset Afib.  He presented to Dr. St's office on 7.17.24 for a hospital follow up after undergoing a LHC at [facility] for TAVR workup and had intravascular lithotripsy using a 4 x 12 shockwave balloon, balloon angioplasty and stenting of the Left Main using a 5 x 12 REAGAN. Upon arrival to the clinic the patient c/o being fatigued and was found to be in Atrial Fibrillation with RVR.  Upon arrival to the ER labs significant for WBC 12.06, Creatinine 1.59, .9, and Troponin 1.834. EKG revealed A Flutter with variable AV block with PVCs.  CXR without acute findings.  The patient was admitted to [facility] for evaluation and treatment of Afib.    7.19.24: NAD. VSS. AFIB CVR. Sitting in the chair. No complaints of CP/SOB/Palps. "I feel okay" Creat 1.51, K 5.4  7.20.24: Patient reporting SOB and "retaining fluid" despite oral lasix. Remains Afib, RVR. BP marginal   7.21.24: Patient sitting up at bedside. NAD. He reports feeling better with additional dose of lasix yesterday but is still carrying extra fluid. Renal indices mildly bumped  7.22.24: Pt post TAVR still in bed rest. NAD.  States "I already feel so much better."  A Flutter on tele with CVR.  VSS.    7.23.24:  Sitting in Chair. NAD.  VSS.  Aflutter with CVR.  Plan for ABRAHAM/DCCV this afternoon.  Pt agrees with procedure.  Denies c/o  7.24.24: " Sitting in chair in NAD. VSS. NSR with multiple PVCs.  Denies Palps, CP, or SOB.      PMH:  CAD s/p CABG and recent PCI, Severe Aortic Stenosis, HFrEF, Type II DM, HTN  PSH:  CABG, Lithotripsy & REAGAN to L Main, Prostate sx, Bilateral Hip Arthroplasty, Catartact extraction  Family History: Mother & Father with H/O CAD  Social History: Denies use of tobacco, ETOH, or illicit drugs      Previous Cardiac Diagnostics:   [facility] (7.10.24):  mLM 70-80% calcified stenosis on IV status post intravascular lithotripsy using shockwave balloon and stenting  Lcx mild CAD  MLAD occlusion  SVG to LAD patent at the origin body and site of insertion fills the LAD well in antegrade and retrograde fashion.  Vein graft to OM patent fills the superior branch of OM antegrade and back fills the inferior branch of OM retrograde  RCA occluded in the midportion of the distal PDA and PLB the fills via left-to-right collaterals  Pullback from LV to aorta there was a 20 mm gradient across the valve  Plan  Severe left main stenosis which supplies circumflex artery status post balloon angioplasty and stenting using a 5 x 12 REAGAN  Severe AS likely low-flow low gradient AS will proceed with TAVR workup    Carotid US (7.3.24)  The study quality is average.  1-39% stenosis in the proximal right internal carotid artery based on Bluth Criteria.   1-39% stenosis in the proximal left internal carotid artery based on Bluth Criteria.  The right vertebral artery is poorly visualized.   5.Antegrade left vertebral artery flow.     TTE (6.13.24):  The left ventricle is normal in size. Global left ventricular systolic function is severely decreased. The left ventricular ejection fraction is 20%. Noted left ventricular hypertrophy. Concentric left ventricular hypertrophy is present. It is mild. LVSI= 13.2 ml/m2.The left atrial diameter is mildly increased.  Suspect severe aortic valve stenosis is present- Low flow low gradient Severe Aortic valve stenosis . Aortic  valve area continuity equation is 0.5 cm². The trans-aortic peak velocity is 2.7 m/s. The trans-aortic mean gradient is 14.7 mmHg. DI= .18.  Mild to moderate (1-2+) mitral regurgitation.   5.The study quality is average       Review of Systems   Constitutional: Negative.   Cardiovascular:  Negative for chest pain, irregular heartbeat, leg swelling and palpitations.   Respiratory:  Negative for shortness of breath.    Skin: Negative.    Genitourinary: Negative.    Neurological: Negative.    All other systems reviewed and are negative.    Objective:     Vital Signs (Most Recent):  Temp: 98.5 °F (36.9 °C) (07/24/24 0735)  Pulse: 91 (07/24/24 0735)  Resp: 18 (07/24/24 0735)  BP: 101/65 (07/24/24 0735)  SpO2: (!) 93 % (07/24/24 0735) Vital Signs (24h Range):  Temp:  [97.3 °F (36.3 °C)-99.3 °F (37.4 °C)] 98.5 °F (36.9 °C)  Pulse:  [63-91] 91  Resp:  [18-20] 18  SpO2:  [93 %-97 %] 93 %  BP: ()/(57-74) 101/65   Weight: 92 kg (202 lb 12.8 oz)  Body mass index is 31.76 kg/m².  SpO2: (!) 93 %       Intake/Output Summary (Last 24 hours) at 7/24/2024 0759  Last data filed at 7/23/2024 1421  Gross per 24 hour   Intake 100 ml   Output --   Net 100 ml     Lines/Drains/Airways       Peripheral Intravenous Line  Duration                  Peripheral IV - Single Lumen 07/17/24 1600 20 G Right Antecubital 6 days         Peripheral IV - Single Lumen 07/22/24 1048 20 G Left;Posterior Hand 1 day                    Significant Labs:   Chemistries:   Recent Labs   Lab 07/17/24  1637 07/17/24  2046 07/18/24  0406 07/18/24  1308 07/18/24  1807 07/19/24  0358 07/20/24  0416 07/21/24  0518 07/22/24  0406 07/23/24  0447 07/24/24  0445     --  141  --   --    < > 137 140 139 137 136   K 4.0  --  5.2* 4.8  --    < > 4.2 4.3 4.8 5.0 4.4     --  104  --   --    < > 105 104 103 105 102   CO2 21*  --  26  --   --    < > 22* 28 24 22* 27   BUN 20.3  --  20.9  --   --    < > 23.5 21.7 26.5* 29.9* 24.6   CREATININE 1.59*  --  1.51*  --    --    < > 1.35* 1.50* 1.61* 1.52* 1.39*   CALCIUM 9.2  --  9.6  --   --    < > 9.1 9.5 9.7 9.5 9.0   BILITOT 0.5  --  1.0  --   --    < >  --  1.9* 1.6*  --  1.2   ALKPHOS 66  --  71  --   --    < >  --  73 73  --  65   ALT 22  --  22  --   --    < >  --  20 21  --  17   AST 24  --  22  --   --    < >  --  20 18  --  18   GLUCOSE 99  --  93  --   --    < > 150* 103 167* 136* 116*   MG 2.10  --  2.30 2.30  --    < > 2.30  --   --  2.60 2.40   TROPONINI 2.561* 1.834* 2.359* 2.751* 3.037*  --   --   --   --   --   --     < > = values in this interval not displayed.        CBC/Anemia Labs: Coags:    Recent Labs   Lab 07/22/24  0406 07/23/24  0447 07/24/24  0445   WBC 8.43 18.07* 13.50*   HGB 15.8 15.3 14.6   HCT 50.9 49.8 47.2    307 224   MCV 84.4 86.0 85.5   RDW 16.1 15.6 15.6    Recent Labs   Lab 07/17/24  1637 07/22/24  0406   INR 1.0  --    APTT 27.6 33.6        Telemetry:  AFIB CVR    Physical Exam  Vitals and nursing note reviewed.   HENT:      Head: Normocephalic.      Nose: Nose normal.      Mouth/Throat:      Mouth: Mucous membranes are moist.   Eyes:      Pupils: Pupils are equal, round, and reactive to light.   Cardiovascular:      Rate and Rhythm: Normal rate. Rhythm irregular.      Pulses: Normal pulses.      Heart sounds: Murmur heard.   Pulmonary:      Effort: Pulmonary effort is normal.   Abdominal:      General: Bowel sounds are normal.      Palpations: Abdomen is soft.   Musculoskeletal:         General: Normal range of motion.      Cervical back: Normal range of motion.   Skin:     General: Skin is warm and dry.      Comments: Rt and Left groin TAVR incision site ANOOP, well-approximated without bleeding or hematoma   Neurological:      Mental Status: He is alert and oriented to person, place, and time.   Psychiatric:         Mood and Affect: Mood normal.         Behavior: Behavior normal.       Current Schedule Inpatient Medications:   aspirin  81 mg Oral Daily    atorvastatin  40 mg Oral Daily     azelastine  2 spray Nasal BID    clopidogreL  75 mg Oral Daily    digoxin  0.125 mg Oral Daily    empagliflozin  10 mg Oral Daily    ezetimibe  10 mg Oral Daily    furosemide  20 mg Oral Daily    glipiZIDE  10 mg Oral Daily    guaiFENesin  1,200 mg Oral BID    metoprolol succinate  50 mg Oral Daily    vitamin D  1,000 Units Oral Daily     Assessment:   Newly Diagnosed AFIB s/p ABRAHAM/DCCV on 7.23.24.  Now NSR     -CHADsVasc--6  (9.7% Stroke Risk Per Year)     -Currently on FD Lovenox  Acute HFrEF Exacerbation (Compensated)      -.9  ICMO     -EF 15-20% per TTE 6.23.24      -EF 20% per TTE 6.13.24     -LifeVest consult placed  Severe Aortic Stenosis     -S/P TAVR 7.22.24  JORDY on CKD (Stable)  NSTEMI likely Type II due to Afib RVR, Acute HF Exacerbation, and JORDY on CKD      -Trop 2.561, 1.834, 2.359  Hypothyroidism    - TSH 5.931   CAD (Multi Vessel)      -s/p CABG and recent  lithotripsy using a 4 x 12 shockwave balloon, balloon angioplasty and stenting of the Left Main using a 5 x 12 REAGAN  Type II DM  HTN  No known history of GI bleed   **Iodine allergy**    Plan:   GDMT HFrEF: Continue Metoprolol succinate 50 mg daily, Lasix, Jardiance, Losartan  GDMT CAD: ASA, Plavix, BB, Atorvastatin  Continue digoxin 125 mcg po daily  Digoxin level on 7.22.24 --0.9  Continue FD Lovenox for Stroke Risk Reduction with placement on DOAC prior to DC.  (Eliquis)  Triple Therapy for now. Will reassess discontinuing ASA as outpatient  Refer to PCP for Thyroid work-up in the outpatient setting  EF 15-20 % post TAVR   Consult Case Management for LifeVest  Consult Dietician for Low Na/Cardiac Diet Education  Start Amiodarone IV gtt per protocol then will do oral load after (400 mg PO BID x 3 days, then 200 mg PO BID, then 200 mg Daily thereafter)  Start Losartan 12.5 mg PO Daily  Keep Mag > 2 and Potassium > 4  Labs in AM: CBC, BMP, and Mag  EKG in AM      DOC Ibrahim  Cardiology  [facility] - [facility] Medical  Telemetry    Physician addendum:  I have seen and examined this patient as a split-shared visit with the MAULIK d/t complicated medical management of above problems written in assessment and high acuity requiring physician expertise in medical decision-making. I performed the substantive portion of the history and exam. Above medical decision-making is also formulated by me.    Plan:  As above. Doing well. C/w med optimization. Hopefully, home tomorrow.     Jr Montez MD [facility]  Int. Cardiologist

## 2024-07-24 NOTE — CONSULTS
"Inpatient Nutrition Evaluation    Admit Date: 7/17/2024   Total duration of encounter: 7 days   Patient Age: 73 y.o.    Nutrition Recommendation/Prescription     Continue Heart Healthy Diet, add 1800 kcal Diabetic modifier  Continue Chocolate Boost Glucose Control BID (250 kcals and 14 g Pro per serving)      Nutrition Assessment     Chart Review    Reason Seen: length of stay and physician consult for "New onset heart failure. Low Na, Cardiac Diet"    Malnutrition Screening Tool Results   Have you recently lost weight without trying?: No  Have you been eating poorly because of a decreased appetite?: No   MST Score: 0   Diagnosis:  New onset Afib RVR  Acute HFrEF exacerbation  JORDY on CKD  NSTEMI    Relevant Medical History:   CAD s/p CABG & PCI, NSTEMI, HFrEF, HTN, Type II DM, and severe AS     Scheduled Medications:  aspirin, 81 mg, Daily  atorvastatin, 40 mg, Daily  azelastine, 2 spray, BID  clopidogreL, 75 mg, Daily  digoxin, 0.125 mg, Daily  empagliflozin, 10 mg, Daily  enoxparin, 1 mg/kg, Q12H (prophylaxis, 0900/2100)  ezetimibe, 10 mg, Daily  furosemide, 20 mg, Daily  glipiZIDE, 10 mg, Daily  guaiFENesin, 1,200 mg, BID  losartan, 12.5 mg, Daily  metoprolol succinate, 50 mg, Daily  vitamin D, 1,000 Units, Daily    Continuous Infusions:  amiodarone in dextrose 5%, Last Rate: 1 mg/min (07/24/24 0958)  amiodarone in dextrose 5%    PRN Medications:   Current Facility-Administered Medications:     0.9%  NaCl infusion (for blood administration), , Intravenous, Q24H PRN    acetaminophen, 650 mg, Oral, Q4H PRN    albuterol, 2 puff, Inhalation, Q4H PRN    dextrose 10%, 12.5 g, Intravenous, PRN    dextrose 10%, 25 g, Intravenous, PRN    hydrALAZINE, 10 mg, Intravenous, Q4H PRN    HYDROcodone-acetaminophen, 1 tablet, Oral, Q4H PRN    melatonin, 6 mg, Oral, Nightly PRN    morphine, 2 mg, Intravenous, Q1H PRN    ondansetron, 4 mg, Intravenous, Q6H PRN    sodium chloride, 1 spray, Each Nostril, PRN    sodium chloride 0.9%, " "10 mL, Intravenous, PRN    Recent Labs   Lab 07/17/24  1637 07/18/24  0406 07/18/24  1308 07/19/24  0358 07/20/24  0416 07/21/24  0518 07/22/24  0406 07/23/24  0447 07/24/24  0445    141  --  139 137 140 139 137 136   K 4.0 5.2* 4.8 5.4* 4.2 4.3 4.8 5.0 4.4   CALCIUM 9.2 9.6  --  9.9 9.1 9.5 9.7 9.5 9.0   MG 2.10 2.30 2.30 2.40 2.30  --   --  2.60 2.40   CO2 21* 26  --  22* 22* 28 24 22* 27   BUN 20.3 20.9  --  22.7 23.5 21.7 26.5* 29.9* 24.6   CREATININE 1.59* 1.51*  --  1.51* 1.35* 1.50* 1.61* 1.52* 1.39*   EGFRNORACEVR 45 48  --  48 55 48 44 47 53   GLUCOSE 99 93  --  112 150* 103 167* 136* 116*   BILITOT 0.5 1.0  --  1.3  --  1.9* 1.6*  --  1.2   ALKPHOS 66 71  --  80  --  73 73  --  65   ALT 22 22  --  23  --  20 21  --  17   AST 24 22  --  20  --  20 18  --  18   ALBUMIN 3.4 3.4  --  3.5  --  3.3* 3.4  --  3.1*   WBC 12.06* 9.53  --  8.88 10.00 9.81 8.43 18.07* 13.50*   HGB 15.7 15.2  --  15.8 14.9 15.4 15.8 15.3 14.6   HCT 51.8 49.9  --  50.8 48.0 49.7 50.9 49.8 47.2     Nutrition Orders:  Diet Heart Healthy  Dietary nutrition supplements Boost Glucose Control - Chocolate; BID    Appetite/Oral Intake: fair/50-75% of meals  Factors Affecting Nutritional Intake: decreased appetite  Food/Jainism/Cultural Preferences: none reported  Food Allergies: no known food allergies  Last Bowel Movement: 07/23/24  Wound(s):  No partial or full thickness pressure injuries documented    Comments    7/23/24: Pt reports fair appetite, states it fluctuates from day to day. Pt denies any unintended wt lss PTA, states his wt ranges from 190 to 210 lbs. Pt agrees to trying ONS for additional nourishment.    7/24/24: Received consult for heart healthy diet education given new onset HF. Educational materials were provided and reviewed with pt.     Anthropometrics    Height: 5' 7" (170.2 cm),    Last Weight: 92 kg (202 lb 12.8 oz) (07/19/24 1525), Weight Method: Standard Scale  BMI (Calculated): 31.8  BMI Classification: obese " grade I (BMI 30-34.9)        Ideal Body Weight (IBW), Male: 148 lb     % Ideal Body Weight, Male (lb): 137.03 %                          Usual Weight Provided By: patient    Wt Readings from Last 5 Encounters:   07/19/24 92 kg (202 lb 12.8 oz)   05/09/24 91.6 kg (202 lb)   08/07/23 98.9 kg (218 lb)     Weight Change(s) Since Admission:   Wt Readings from Last 1 Encounters:   07/19/24 1525 92 kg (202 lb 12.8 oz)   07/17/24 2222 93.9 kg (207 lb)   07/17/24 1540 93.9 kg (207 lb)   Admit Weight: 93.9 kg (207 lb) (07/17/24 1540), Weight Method: Standard Scale    Patient Education     Education Provided: heart healthy diet and low sodium diet  Teaching Method: explanation and printed materials  Comprehension: verbalizes understanding  Barriers to Learning: acuity of illness and difficulty concentrating  Expected Compliance: fair  Comments: All questions were answered and dietitian's contact information was provided.     Nutrition Goals & Monitoring     Dietitian will monitor: energy intake, weight, and glucose/endocrine profile    Nutrition Risk/Follow-Up: low (follow-up in 5-7 days)  Patients assigned 'low nutrition risk' status do not qualify for a full nutritional assessment but will be monitored and re-evaluated in a 5-7 day time period. Please consult if re-evaluation needed sooner.

## 2024-07-24 NOTE — NURSING
Nurses Note -- 4 Eyes      7/24/2024   6:38 PM      Skin assessed during: Q Shift Change      [x] No Altered Skin Integrity Present    [x]Prevention Measures Documented      [] Yes- Altered Skin Integrity Present or Discovered   [] LDA Added if Not in Epic (Describe Wound)   [] New Altered Skin Integrity was Present on Admit and Documented in LDA   [] Wound Image Taken    Wound Care Consulted? No    Attending Nurse:  DAI Serrato     Second RN/Staff Member:  DAI Self

## 2024-07-24 NOTE — NURSING
Nurses Note -- 4 Eyes      7/23/2024   10:07 PM      Skin assessed during: Q Shift Change      [x] No Altered Skin Integrity Present    [x]Prevention Measures Documented      [] Yes- Altered Skin Integrity Present or Discovered   [] LDA Added if Not in Epic (Describe Wound)   [] New Altered Skin Integrity was Present on Admit and Documented in LDA   [] Wound Image Taken    Wound Care Consulted? No    Attending Nurse:  Eda López RN/Staff Member:   Rosario

## 2024-07-25 VITALS
HEIGHT: 67 IN | SYSTOLIC BLOOD PRESSURE: 104 MMHG | DIASTOLIC BLOOD PRESSURE: 67 MMHG | RESPIRATION RATE: 20 BRPM | OXYGEN SATURATION: 95 % | WEIGHT: 202.81 LBS | HEART RATE: 88 BPM | BODY MASS INDEX: 31.83 KG/M2 | TEMPERATURE: 98 F

## 2024-07-25 PROBLEM — I48.91 ATRIAL FIBRILLATION WITH RVR: Status: ACTIVE | Noted: 2024-07-25

## 2024-07-25 PROBLEM — Z95.3 S/P TAVR (TRANSCATHETER AORTIC VALVE REPLACEMENT): Status: ACTIVE | Noted: 2024-07-25

## 2024-07-25 PROBLEM — Z95.2 S/P TAVR (TRANSCATHETER AORTIC VALVE REPLACEMENT): Status: ACTIVE | Noted: 2024-07-25

## 2024-07-25 LAB
ALBUMIN SERPL-MCNC: 3 G/DL (ref 3.4–4.8)
ALBUMIN/GLOB SERPL: 0.8 RATIO (ref 1.1–2)
ALP SERPL-CCNC: 67 UNIT/L (ref 40–150)
ALT SERPL-CCNC: 16 UNIT/L (ref 0–55)
ANION GAP SERPL CALC-SCNC: 11 MEQ/L
AST SERPL-CCNC: 21 UNIT/L (ref 5–34)
BASOPHILS # BLD AUTO: 0.06 X10(3)/MCL
BASOPHILS NFR BLD AUTO: 0.4 %
BILIRUB SERPL-MCNC: 1.5 MG/DL
BUN SERPL-MCNC: 22.5 MG/DL (ref 8.4–25.7)
CALCIUM SERPL-MCNC: 8.8 MG/DL (ref 8.8–10)
CHLORIDE SERPL-SCNC: 101 MMOL/L (ref 98–107)
CO2 SERPL-SCNC: 24 MMOL/L (ref 23–31)
CREAT SERPL-MCNC: 1.25 MG/DL (ref 0.73–1.18)
CREAT/UREA NIT SERPL: 18
EOSINOPHIL # BLD AUTO: 0.07 X10(3)/MCL (ref 0–0.9)
EOSINOPHIL NFR BLD AUTO: 0.5 %
ERYTHROCYTE [DISTWIDTH] IN BLOOD BY AUTOMATED COUNT: 15.9 % (ref 11.5–17)
GFR SERPLBLD CREATININE-BSD FMLA CKD-EPI: 60 ML/MIN/1.73/M2
GLOBULIN SER-MCNC: 3.6 GM/DL (ref 2.4–3.5)
GLUCOSE SERPL-MCNC: 119 MG/DL (ref 82–115)
HCT VFR BLD AUTO: 47.4 % (ref 42–52)
HGB BLD-MCNC: 14.4 G/DL (ref 14–18)
IMM GRANULOCYTES # BLD AUTO: 0.13 X10(3)/MCL (ref 0–0.04)
IMM GRANULOCYTES NFR BLD AUTO: 0.9 %
LYMPHOCYTES # BLD AUTO: 1.36 X10(3)/MCL (ref 0.6–4.6)
LYMPHOCYTES NFR BLD AUTO: 9.6 %
MAGNESIUM SERPL-MCNC: 2.4 MG/DL (ref 1.6–2.6)
MCH RBC QN AUTO: 26.3 PG (ref 27–31)
MCHC RBC AUTO-ENTMCNC: 30.4 G/DL (ref 33–36)
MCV RBC AUTO: 86.7 FL (ref 80–94)
MONOCYTES # BLD AUTO: 1.27 X10(3)/MCL (ref 0.1–1.3)
MONOCYTES NFR BLD AUTO: 9 %
NEUTROPHILS # BLD AUTO: 11.25 X10(3)/MCL (ref 2.1–9.2)
NEUTROPHILS NFR BLD AUTO: 79.6 %
NRBC BLD AUTO-RTO: 0 %
OHS QRS DURATION: 132 MS
OHS QTC CALCULATION: 502 MS
PLATELET # BLD AUTO: 196 X10(3)/MCL (ref 130–400)
PMV BLD AUTO: 10 FL (ref 7.4–10.4)
POCT GLUCOSE: 168 MG/DL (ref 70–110)
POTASSIUM SERPL-SCNC: 3.9 MMOL/L (ref 3.5–5.1)
PROT SERPL-MCNC: 6.6 GM/DL (ref 5.8–7.6)
RBC # BLD AUTO: 5.47 X10(6)/MCL (ref 4.7–6.1)
SODIUM SERPL-SCNC: 136 MMOL/L (ref 136–145)
WBC # BLD AUTO: 14.14 X10(3)/MCL (ref 4.5–11.5)

## 2024-07-25 PROCEDURE — 25000003 PHARM REV CODE 250: Performed by: PHYSICIAN ASSISTANT

## 2024-07-25 PROCEDURE — 63600175 PHARM REV CODE 636 W HCPCS: Performed by: NURSE PRACTITIONER

## 2024-07-25 PROCEDURE — 25000003 PHARM REV CODE 250: Performed by: NURSE PRACTITIONER

## 2024-07-25 PROCEDURE — 80053 COMPREHEN METABOLIC PANEL: CPT | Performed by: NURSE PRACTITIONER

## 2024-07-25 PROCEDURE — 83735 ASSAY OF MAGNESIUM: CPT | Performed by: NURSE PRACTITIONER

## 2024-07-25 PROCEDURE — 85025 COMPLETE CBC W/AUTO DIFF WBC: CPT | Performed by: NURSE PRACTITIONER

## 2024-07-25 PROCEDURE — 25000242 PHARM REV CODE 250 ALT 637 W/ HCPCS: Performed by: PHYSICIAN ASSISTANT

## 2024-07-25 PROCEDURE — 93010 ELECTROCARDIOGRAM REPORT: CPT | Mod: ,,, | Performed by: INTERNAL MEDICINE

## 2024-07-25 PROCEDURE — 93005 ELECTROCARDIOGRAM TRACING: CPT

## 2024-07-25 PROCEDURE — 36415 COLL VENOUS BLD VENIPUNCTURE: CPT | Performed by: NURSE PRACTITIONER

## 2024-07-25 RX ORDER — AMIODARONE HYDROCHLORIDE 200 MG/1
200 TABLET ORAL 2 TIMES DAILY
Status: DISCONTINUED | OUTPATIENT
Start: 2024-07-25 | End: 2024-07-25 | Stop reason: HOSPADM

## 2024-07-25 RX ORDER — DIGOXIN 125 MCG
0.12 TABLET ORAL DAILY
Qty: 30 TABLET | Refills: 11 | Status: SHIPPED | OUTPATIENT
Start: 2024-07-26 | End: 2025-07-26

## 2024-07-25 RX ORDER — LOSARTAN POTASSIUM 25 MG/1
12.5 TABLET ORAL DAILY
Qty: 15 TABLET | Refills: 11 | Status: SHIPPED | OUTPATIENT
Start: 2024-07-26 | End: 2025-07-26

## 2024-07-25 RX ORDER — METOPROLOL SUCCINATE 50 MG/1
50 TABLET, EXTENDED RELEASE ORAL DAILY
Qty: 30 TABLET | Refills: 11 | Status: SHIPPED | OUTPATIENT
Start: 2024-07-26 | End: 2025-07-26

## 2024-07-25 RX ORDER — AMIODARONE HYDROCHLORIDE 200 MG/1
200 TABLET ORAL 2 TIMES DAILY
Qty: 60 TABLET | Refills: 11 | Status: SHIPPED | OUTPATIENT
Start: 2024-07-25 | End: 2025-07-25

## 2024-07-25 RX ADMIN — METOPROLOL SUCCINATE 50 MG: 50 TABLET, EXTENDED RELEASE ORAL at 08:07

## 2024-07-25 RX ADMIN — GUAIFENESIN 1200 MG: 600 TABLET, EXTENDED RELEASE ORAL at 08:07

## 2024-07-25 RX ADMIN — CLOPIDOGREL BISULFATE 75 MG: 75 TABLET ORAL at 08:07

## 2024-07-25 RX ADMIN — AMIODARONE HYDROCHLORIDE 200 MG: 200 TABLET ORAL at 10:07

## 2024-07-25 RX ADMIN — EZETIMIBE 10 MG: 10 TABLET ORAL at 08:07

## 2024-07-25 RX ADMIN — DIGOXIN 0.12 MG: 125 TABLET ORAL at 08:07

## 2024-07-25 RX ADMIN — FUROSEMIDE 20 MG: 20 TABLET ORAL at 10:07

## 2024-07-25 RX ADMIN — GLIPIZIDE 10 MG: 10 TABLET ORAL at 08:07

## 2024-07-25 RX ADMIN — LOSARTAN POTASSIUM 12.5 MG: 25 TABLET, FILM COATED ORAL at 10:07

## 2024-07-25 RX ADMIN — ATORVASTATIN CALCIUM 40 MG: 40 TABLET, FILM COATED ORAL at 08:07

## 2024-07-25 RX ADMIN — ENOXAPARIN SODIUM 90 MG: 100 INJECTION SUBCUTANEOUS at 08:07

## 2024-07-25 RX ADMIN — EMPAGLIFLOZIN 10 MG: 10 TABLET, FILM COATED ORAL at 09:07

## 2024-07-25 RX ADMIN — Medication 1000 UNITS: at 08:07

## 2024-07-25 RX ADMIN — ASPIRIN 81 MG: 81 TABLET, COATED ORAL at 08:07

## 2024-07-25 RX ADMIN — AMIODARONE HYDROCHLORIDE 0.5 MG/MIN: 1.8 INJECTION, SOLUTION INTRAVENOUS at 04:07

## 2024-07-25 NOTE — PLAN OF CARE
07/25/24 1300   Final Note   Assessment Type Final Discharge Note   Anticipated Discharge Disposition Home   Post-Acute Status   Discharge Delays None known at this time     Pt dc to home with [employer] life vest . No needs noted for CM.

## 2024-07-25 NOTE — NURSING
Nurses Note -- 4 Eyes      7/24/2024   9:44 PM      Skin assessed during: Daily Assessment      [x] No Altered Skin Integrity Present    []Prevention Measures Documented      [] Yes- Altered Skin Integrity Present or Discovered   [] LDA Added if Not in Epic (Describe Wound)   [] New Altered Skin Integrity was Present on Admit and Documented in LDA   [] Wound Image Taken    Wound Care Consulted? No    Attending Nurse:  Shanon López RN/Staff Member:   Ama

## 2024-07-25 NOTE — DISCHARGE SUMMARY
"[facility] - [facility] Medical Telemetry    Cardiology  Discharge Summary      Patient Name: Cole Oakley  MRN: [id number]  Admission Date: 7/17/2024  Hospital Length of Stay: 8 days  Discharge Date and Time:  07/25/2024 1:22 PM  Attending Physician: Isaiah Mckeon MD  Discharging Provider: DOC Ibrahim  Primary Care Physician: Dina Scott MD    HPI/Hospital Course:   Patient is a 73 y.o. male known to Dr. St, with a pmh of CAD s/p CABG & PCI, NSTEMI, HFrEF, HTN, Type II DM, and severe AS. He was sent to [facility] ER from Dr. St's office for new onset Afib.  He presented to Dr. St's office on 7.17.24 for a hospital follow up after undergoing a LHC at [facility] for TAVR workup and had intravascular lithotripsy using a 4 x 12 shockwave balloon, balloon angioplasty and stenting of the Left Main using a 5 x 12 REAGAN. Upon arrival to the clinic the patient c/o being fatigued and was found to be in Atrial Fibrillation with RVR.  Upon arrival to the ER labs significant for WBC 12.06, Creatinine 1.59, .9, and Troponin 1.834. EKG revealed A Flutter with variable AV block with PVCs.  CXR without acute findings.  The patient was admitted to [facility] for evaluation and treatment of Afib.     7.19.24: NAD. VSS. AFIB CVR. Sitting in the chair. No complaints of CP/SOB/Palps. "I feel okay" Creat 1.51, K 5.4  7.20.24: Patient reporting SOB and "retaining fluid" despite oral lasix. Remains Afib, RVR. BP marginal   7.21.24: Patient sitting up at bedside. NAD. He reports feeling better with additional dose of lasix yesterday but is still carrying extra fluid. Renal indices mildly bumped  7.22.24: Pt post TAVR still in bed rest. NAD.  States "I already feel so much better."  A Flutter on tele with CVR.  VSS.    7.23.24:  Sitting in Chair. NAD.  VSS.  Aflutter with CVR.  Plan for ABRAHAM/DCCV this afternoon.  Pt agrees with procedure.  Denies c/o  7.24.24: Sitting in chair in NAD. VSS. NSR with " multiple PVCs.  Denies Palps, CP, or SOB.       PMH:  CAD s/p CABG and recent PCI, Severe Aortic Stenosis, HFrEF, Type II DM, HTN  PSH:  CABG, Lithotripsy & REAGAN to L Main, Prostate sx, Bilateral Hip Arthroplasty, Catartact extraction  Family History: Mother & Father with H/O CAD  Social History: Denies use of tobacco, ETOH, or illicit drugs     Previous Cardiac Diagnostics:   [facility] (7.10.24):  mLM 70-80% calcified stenosis on IV status post intravascular lithotripsy using shockwave balloon and stenting  Lcx mild CAD  MLAD occlusion  SVG to LAD patent at the origin body and site of insertion fills the LAD well in antegrade and retrograde fashion.  Vein graft to OM patent fills the superior branch of OM antegrade and back fills the inferior branch of OM retrograde  RCA occluded in the midportion of the distal PDA and PLB the fills via left-to-right collaterals  Pullback from LV to aorta there was a 20 mm gradient across the valve  Plan  Severe left main stenosis which supplies circumflex artery status post balloon angioplasty and stenting using a 5 x 12 REAGAN  Severe AS likely low-flow low gradient AS will proceed with TAVR workup     Carotid US (7.3.24)  The study quality is average.  1-39% stenosis in the proximal right internal carotid artery based on Bluth Criteria.   1-39% stenosis in the proximal left internal carotid artery based on Bluth Criteria.  The right vertebral artery is poorly visualized.   5.Antegrade left vertebral artery flow.     TTE (6.13.24):  The left ventricle is normal in size. Global left ventricular systolic function is severely decreased. The left ventricular ejection fraction is 20%. Noted left ventricular hypertrophy. Concentric left ventricular hypertrophy is present. It is mild. LVSI= 13.2 ml/m2.The left atrial diameter is mildly increased.  Suspect severe aortic valve stenosis is present- Low flow low gradient Severe Aortic valve stenosis . Aortic valve area continuity equation is 0.5 cm².  The trans-aortic peak velocity is 2.7 m/s. The trans-aortic mean gradient is 14.7 mmHg. DI= .18.  Mild to moderate (1-2+) mitral regurgitation.   5.The study quality is averag    Procedure(s) (LRB):  REPLACEMENT, AORTIC VALVE, TRANSCATHETER (TAVR) (N/A)   Consults:   Consults (From admission, onward)          Status Ordering Provider     Inpatient consult to Registered Dietitian/Nutritionist  Once        Provider:  (Not yet assigned)    Completed JOHNATHAN GRIFFIN     Inpatient consult to Social Work/Case Management  Once        Provider:  (Not yet assigned)    Completed JOHNATHAN GRIFFIN     Inpatient consult to Cardiothoracic Surgery  Once        Provider:  Rodrigo Peres MD    Completed KAILEY CANDELARIA          Final Active Diagnoses:    Diagnosis Date Noted POA    PRINCIPAL PROBLEM:  S/P TAVR (transcatheter aortic valve replacement) [Z95.2] 07/25/2024 Not Applicable    Atrial fibrillation with RVR [I48.91] 07/25/2024 Unknown      Problems Resolved During this Admission:     Discharged Condition: stable  Review of Systems   Constitutional: Negative.   Eyes: Negative.    Cardiovascular:  Negative for chest pain, dyspnea on exertion and leg swelling.   Respiratory:  Negative for cough and shortness of breath.    Skin: Negative.    Musculoskeletal: Negative.    Gastrointestinal: Negative.    Genitourinary: Negative.    Neurological: Negative.      Physical Exam  Cardiovascular:      Rate and Rhythm: Normal rate and regular rhythm.   Pulmonary:      Effort: Pulmonary effort is normal.      Breath sounds: Normal breath sounds.   Musculoskeletal:      Right lower leg: No edema.      Left lower leg: No edema.   Skin:     General: Skin is warm and dry.   Neurological:      Mental Status: He is alert and oriented to person, place, and time.   Psychiatric:         Mood and Affect: Mood normal.         Behavior: Behavior normal.       Disposition: Home or Self Care  Follow Up:   Follow-up Information       Darin Rahman MD  Follow up in 1 week(s).    Specialty: Cardiology  Why: Post TAVR  Contact information:  [address] [facility]  [facility] [address]  [phone]               Freyou, Vale L., NP Follow up on 8/8/2024.    Specialty: Cardiology  Why: @ 8:30AM  Contact information:  [address]  [address]  [place] [address]  [phone]                           Patient Instructions:      Diet Cardiac     Lifting restrictions   Order Comments: Do not lift anything over 10 lbs for 3-4 days     Activity as tolerated     Medications:  Reconciled Home Medications:      Medication List        START taking these medications      amiodarone 200 MG Tab  Commonly known as: PACERONE  Take 1 tablet (200 mg total) by mouth 2 (two) times daily.     apixaban 5 mg Tab  Commonly known as: ELIQUIS  Take 1 tablet (5 mg total) by mouth 2 (two) times daily.     digoxin 125 mcg tablet  Commonly known as: LANOXIN  Take 1 tablet (0.125 mg total) by mouth once daily.  Start taking on: July 26, 2024     losartan 25 MG tablet  Commonly known as: COZAAR  Take 0.5 tablets (12.5 mg total) by mouth once daily.  Start taking on: July 26, 2024            CHANGE how you take these medications      metoprolol succinate 50 MG 24 hr tablet  Commonly known as: TOPROL-XL  Take 1 tablet (50 mg total) by mouth once daily.  Start taking on: July 26, 2024  What changed:   medication strength  how much to take            CONTINUE taking these medications      albuterol 90 mcg/actuation inhaler  Commonly known as: PROVENTIL/VENTOLIN HFA  Inhale 2 puffs into the lungs every 4 (four) hours as needed.     aspirin 81 MG EC tablet  Commonly known as: ECOTRIN  Take 81 mg by mouth once daily.     azelastine 137 mcg (0.1 %) nasal spray  Commonly known as: ASTELIN  2 sprays by Nasal route 2 (two) times daily.     cetirizine 10 MG tablet  Commonly known as: ZYRTEC  Take 10 mg by mouth once daily.     cholecalciferol (vitamin D3) 25 mcg  (1,000 unit) capsule  Commonly known as: VITAMIN D3  Take 1,000 Units by mouth once daily.     clopidogreL 75 mg tablet  Commonly known as: PLAVIX  Take 75 mg by mouth once daily.     empagliflozin 10 mg tablet  Commonly known as: Jardiance  Take 10 mg by mouth once daily.     ezetimibe 10 mg tablet  Commonly known as: ZETIA  Take 10 mg by mouth once daily.     furosemide 20 MG tablet  Commonly known as: LASIX  Take 20 mg by mouth once daily.     glipiZIDE 10 MG tablet  Commonly known as: GLUCOTROL  Take 10 mg by mouth once daily.     guaiFENesin 600 mg 12 hr tablet  Commonly known as: MUCINEX  Take 1,200 mg by mouth 2 (two) times daily.     rosuvastatin 40 MG Tab  Commonly known as: CRESTOR  Take 40 mg by mouth once daily.     sodium chloride 0.65 % nasal spray  Commonly known as: OCEAN  1 spray by Nasal route as needed for Congestion.            STOP taking these medications      famotidine 20 MG tablet  Commonly known as: PEPCID     lisinopriL 2.5 MG tablet  Commonly known as: PRINIVIL,ZESTRIL            Impression:  Newly Diagnosed AFIB s/p ABRAHAM/DCCV on 7.23.24.  Now NSR     -CHADsVasc--6  (9.7% Stroke Risk Per Year)     -Currently on FD Lovenox  Acute HFrEF Exacerbation (Compensated)      -.9  ICMO     -EF 15-20% per TTE 6.23.24      -EF 20% per TTE 6.13.24     -LifeVest consult placed  Severe Aortic Stenosis     -S/P TAVR 7.22.24  JORDY on CKD (Stable)  NSTEMI likely Type II due to Afib RVR, Acute HF Exacerbation, and JORDY on CKD      -Trop 2.561, 1.834, 2.359  Hypothyroidism    - TSH 5.931   CAD (Multi Vessel)      -s/p CABG and recent  lithotripsy using a 4 x 12 shockwave balloon, balloon angioplasty and stenting of the Left Main using a 5 x 12 REAGAN  Type II DM  HTN  No known history of GI bleed   **Iodine allergy**     Plan:   GDMT HFrEF: Continue Metoprolol succinate 50 mg daily, Lasix, Jardiance, Losartan  GDMT CAD: ASA, Plavix, BB, Atorvastatin  Continue digoxin 125 mcg po daily  Digoxin level on  7.22.24 --0.9  D/C Lovenox and start Eliquis 5 mg PO BID  Triple Therapy for now. Will reassess discontinuing ASA as outpatient  Refer to PCP for Thyroid work-up in the outpatient setting  EF 15-20 % post TAVR   LifeVest On patient  Consulted Dietician for Low Na/Cardiac Diet Education (complete)  D/C IV amio and start Amiodarone 200 mg PO BID  Continue Losartan 12.5 mg PO Daily  Ok to d/c home  F/U with LAURA Loza NP on 8.8.24 @ 8:30 AM  F/U with Dr. Rahman on 8.28.24 (Office will call if sooner appt becomes available)           Heart Failure GDMT:  LVEF: 15-20%  BB: Metoprolol  ACEi/ARB/ARNI:  Losartan  MRNA: Will assess as outpatient (BP borderline)  SGLT2-Inhibitor: Jardiance  Maintenance Diuretic: Lovenox  ICD/Device: LifeVest  Discharge Dry Weight: 92 kg      IMPORTANT Antiplatelet Medication Instructions:  The patient, Cole Oakley, was instructed by Sarah Wong on the importance of Antiplatelet Medication(s) and he verbalizes understanding.   He will continue taking his present antiplatelet ASA and Plavix as prescribed.   His new antiplatelet ASA and Plavix was sent to the [facility] pharmacy.  **WARNING:  Never stop **PLAVIX, EFFIENT, or BRILINTA** without first speaking to a [facility] provider** (even if another physician or surgeon insists it must be stopped for a procedure)    Time spent on the discharge of patient: 45 minutes    DOC Ibrahim  Cardiology  [facility] - [facility] Medical Telemetry     I agree with the findings of the complexity of problems addressed and take responsibility for the plan's risks and complications. I approved the plan documented by Sarah Wong NP.

## 2024-07-25 NOTE — NURSING
Pt AAO. Vvs. IV and tele removed, pt instructed on dc instructions meds and follow up appoinments, verbilized understanding  Wound site intact dry, no s/s of infection. Pt instructed whern to notify physician.   Meds to bed delivered at bedside.

## 2024-07-31 ENCOUNTER — TELEMEDICINE (OUTPATIENT)
Dept: NEUROLOGY | Facility: HOSPITAL | Age: 74
End: 2024-07-31
Payer: OTHER GOVERNMENT

## 2024-07-31 DIAGNOSIS — G47.33 OSA (OBSTRUCTIVE SLEEP APNEA): ICD-10-CM

## 2024-07-31 DIAGNOSIS — E11.9 TYPE 2 DIABETES MELLITUS WITHOUT COMPLICATION, WITHOUT LONG-TERM CURRENT USE OF INSULIN: ICD-10-CM

## 2024-07-31 DIAGNOSIS — I48.91 ATRIAL FIBRILLATION, UNSPECIFIED TYPE: ICD-10-CM

## 2024-07-31 DIAGNOSIS — I63.89 ACUTE ARTERIAL ISCHEMIC STROKE, MULTIFOCAL, MULTIPLE VASCULAR TERRITORIES: Primary | ICD-10-CM

## 2024-07-31 DIAGNOSIS — I10 PRIMARY HYPERTENSION: ICD-10-CM

## 2024-07-31 DIAGNOSIS — E78.5 HYPERLIPIDEMIA, UNSPECIFIED HYPERLIPIDEMIA TYPE: ICD-10-CM

## 2024-07-31 NOTE — TELEMEDICINE CONSULT
[facility]   TeleVascular Neurology  Consult Note      Consult Information  Consulting Provider: Lynne Garcia DNP  Patient Location: [facility]  Consulted by:    Spoke hospital nurse at bedside with patient assisting consultant.  Patient information was obtained from patient and spouse/SO.       Neurology Documentation  NIH Stroke Scale:    Level of Consciousness: 0 - alert  LOC Questions: 0 - answers both correctly  LOC Commands: 0 - performs both correctly  Best Gaze: 0 - normal  Visual: 0 - no visual loss  Facial Palsy: 1 - minor  Motor Left Arm: 0 - no drift  Motor Right Arm: 0 - no drift  Motor Left Le - no drift  Motor Right Le - no drift  Limb Ataxia: 0 - absent  Sensory: 0 - normal  Best Language: 1 - mild to moderate aphasia  Dysarthria: 1 - mild to moderate dysarthria  Extinction and Inattention: 0 - no neglect  NIH Stroke Scale Total: 3  [id number] Scale:   Age: 2 - 75 years old or older  CHF History: 1 - yes  HTN History: 1 - yes  Stroke/TIA/Thromboembolism History: 2 - yes  Vascular Disease History: 1 - yes  Diabetes Mellitus in History: 1 - yes  Female: 0 - no  [id number] Scale Total: 8          Medical Decision Making  HPI:  73 y.o. male Afib, HTN, HLD, DM2, CAD, MI, CABG, CHF, KATELYN, Aortic valve stenosis (s/p TAVR) presented presented 2024 with swelling of the legs.   Admitted for hospital acquired pneumonia, CHF, and possible NSTEMI.   patient reported slurred speech since he was discharged home and had right facial droop noted.  MRI completed showing acute/subacute infarcts prompting TeleVascular Neurology consult. Patient recently underwent left heart cath with stent placement.  Was also noted to have severe aortic stenosis underwent TAVR.  Discharged 2 days ago.     Patient states he first noticed symptoms on Friday after being home from the hospital.  The symptoms have persisted. Reports left facial droop, blurry vision left eye, slurred speech, numbness of the left  face.  Denies double vision, swallowing difficulty, unilateral weakness arms/legs (does report generalized leg weakness), HA, dizziness.    Medical records faxed for review: H&P, progress note 7/31, CT head report, MRI report, CXR, CXR, US lower extremities, TTE, labs.    Assessment and plan based on above records only.    Images personally reviewed and interpreted:  Study: Head CT and MRI Brain  Study Interpretation: CT head: Hypodensity right superior parietal lobe.  MRI brain: Multiple areas of diffusion restriction - punctate lesion left cerebellum, moderate area left midbrain/cerebral peduncle, punctate right occipital, small area left corona radiata, punctate right frontal lobe, punctate posterior left frontal lobe.  No hemorrhage.  Remote infarct right parietal lobe.    Additional studies reviewed:   Study: TTE  Study Interpretation: EF 40-45%; no wall motion abnormality noted; no thrombus/vegetation noted; bi-atrial enlargement      Laboratory studies reviewed:  CBC, CMP, Coags    Documentation personally reviewed:  Notes: H&P, progress notes     Assessment and plan:  73 y.o. male Afib, HTN, HLD, DM2, CAD, MI, CABG, CHF, KATELYN, Aortic valve stenosis (s/p TAVR) now with multifocal infarcts involving both anterior and posterior circulation on both the right and left concerning for embolic process in setting of known afib and recent TAVR (largest area involving left midbrain/cerebral peduncle).  Most likely cardioembolic etiology but would obtain CTA to investigate for any underlying symptomatic stenosis given his multiple risk factors.    Recommendations  -Continue home Eliquis for secondary stroke prevention in setting of Afib ([id number] Score 8)  -Continue home Rosuvastatin 40mg daily for goal LDL < 70  -CTA head and neck to investigate for symptomatic stenosis  -Stroke risk factor modification:afib, HTN, HLD, DM2, KATELYN, CAD, CHF  -Follow therapy recs  -If CTA unremarkable, no further inpatient stroke workup  and patient can dispo with therapy recommendations once medically ready    Post charge discharge plan:  Clinic follow up: Vascular Neurology     Visit Type: In-person or Virtual Visit  Timeframe:  4 weeks    Additional Physical Exam, History, & ROS  Review of Systems   HENT:  Negative for drooling and trouble swallowing.    Eyes:  Positive for visual disturbance.   Neurological:  Positive for facial asymmetry, speech difficulty, weakness and numbness. Negative for dizziness and headaches.   Psychiatric/Behavioral:  Negative for agitation and confusion.      Physical Exam  Constitutional:       General: He is not in acute distress.  HENT:      Head: Normocephalic.      Right Ear: External ear normal.      Left Ear: External ear normal.      Nose: Nose normal.   Pulmonary:      Effort: Pulmonary effort is normal. No respiratory distress.   Abdominal:      General: There is no distension.      Tenderness: There is no guarding.   Musculoskeletal:      Right lower leg: No edema.      Left lower leg: No edema.   Skin:     General: Skin is dry.   Neurological:      Mental Status: He is alert.   Psychiatric:         Mood and Affect: Mood normal.         Behavior: Behavior normal.          Neurological Exam  LOC: alert  Attention Span: Good   Language: Some difficulty naming; paraphasic errors with sentences.  Articulation: Dysarthria  Orientation: Person, Place, Time   Visual Fields: Full  EOM (CN III, IV, VI): Full/intact  Facial Sensation (CN V): Normal  Facial Movement (CN VII): Lower facial weakness on the Left  Motor: Arm left    Full antigravity; Full power noted with nurse assistance  Leg left    Full antigravity; Some loss of power noted with nurse assistance  Arm right   Full antigravity; Full power noted with nurse assistance  Leg right   Full antigravity; Full power noted with nurse assistance  Cerebellum: No evidence of appendicular or axial ataxia  Sensation: Intact to light touch, temperature and  vibration    Past Medical History:   Diagnosis Date    Chronic kidney disease, unspecified     Dilated cardiomyopathy     Essential (primary) hypertension     GERD (gastroesophageal reflux disease)     Mixed hyperlipidemia     KATELYN (obstructive sleep apnea)     Prostate cancer     Type 2 diabetes mellitus without complications      Past Surgical History:   Procedure Laterality Date    TRANSCATHETER AORTIC VALVE REPLACEMENT (TAVR) N/A 7/22/2024    Procedure: REPLACEMENT, AORTIC VALVE, TRANSCATHETER (TAVR);  Surgeon: Darin Rahman MD;  Location: [facility] CATH LAB;  Service: Cardiology;  Laterality: N/A;     No family history on file.        Diagnoses  -Acute multifocal, multiple territory infarct involving both right and left  -Afib  -HTN  -HLD  -DM2  -KATELYN    Lynne Garcia NP    Neurology consultation requested by spoke provider. Audiovisual encounter with the patient performed using a secure connection.  Results and impressions from the visit are documented on this note and were communicated to the consulting provider/team via direct communication. The note has been shared for addition to the patients electronic medical record.    Collaborating Physician, Dr. Ireland, was available during today's encounter. Any change to plan along with cosign to appear in the EMR.

## 2024-08-10 NOTE — PHYSICIAN QUERY
Please clarify the conflicting documentation.      Acute on Chronic Systolic Heart Failure (HFrEF or HFmrEF)

## 2024-09-26 DIAGNOSIS — I63.9 CEREBRAL INFARCTION: Primary | ICD-10-CM

## 2024-10-01 ENCOUNTER — TELEPHONE (OUTPATIENT)
Dept: NEUROLOGY | Facility: CLINIC | Age: 74
End: 2024-10-01
Payer: OTHER GOVERNMENT

## 2024-10-17 ENCOUNTER — OFFICE VISIT (OUTPATIENT)
Dept: NEUROLOGY | Facility: CLINIC | Age: 74
End: 2024-10-17
Payer: OTHER GOVERNMENT

## 2024-10-17 VITALS
HEIGHT: 67 IN | DIASTOLIC BLOOD PRESSURE: 58 MMHG | WEIGHT: 191 LBS | SYSTOLIC BLOOD PRESSURE: 115 MMHG | HEART RATE: 64 BPM | BODY MASS INDEX: 29.98 KG/M2

## 2024-10-17 DIAGNOSIS — I63.40 CEREBRAL INFARCTION DUE TO EMBOLISM OF CEREBRAL ARTERY: ICD-10-CM

## 2024-10-17 DIAGNOSIS — Z95.2 S/P TAVR (TRANSCATHETER AORTIC VALVE REPLACEMENT): ICD-10-CM

## 2024-10-17 DIAGNOSIS — Z86.73 HISTORY OF EMBOLIC STROKE: Primary | ICD-10-CM

## 2024-10-17 DIAGNOSIS — I50.20 HEART FAILURE WITH REDUCED EJECTION FRACTION: ICD-10-CM

## 2024-10-17 DIAGNOSIS — I48.91 ATRIAL FIBRILLATION WITH RVR: ICD-10-CM

## 2024-10-17 PROCEDURE — 99214 OFFICE O/P EST MOD 30 MIN: CPT | Mod: PBBFAC | Performed by: PSYCHIATRY & NEUROLOGY

## 2024-10-17 PROCEDURE — 99999 PR PBB SHADOW E&M-EST. PATIENT-LVL IV: CPT | Mod: PBBFAC,,, | Performed by: PSYCHIATRY & NEUROLOGY

## 2024-10-17 RX ORDER — FLUTICASONE PROPIONATE 50 MCG
2 SPRAY, SUSPENSION (ML) NASAL DAILY PRN
COMMUNITY

## 2024-10-17 NOTE — PROGRESS NOTES
Neurology Office Visit  Neurology  HPI:    Cole Oakley is a 73 y.o. male who is [place] [facility] [place]. He had a TAVR in July following which he had an embolic stroke. MRI had showed multifocal infarcts in bilateral hemispheres. He has a H/o HTN, HLD, DM2, CAD, CHF, KATELYN, A.fib and on eliquis currently. He is accompanied by his wife. He states that he is continuously improving since the stroke but not near his baseline yet. He is independent in his ADLs but does not drive and uses a walker to ambulate. His speech has improved as well but not at his baseline yet. He does twice a week therapy as outpatient. Denies any bleeding concerns but reports fatigue since the stroke. Also reports dryiness in skin and mouth. Denies any new stroke symptoms since July.     ROS:  Review of Systems   Constitutional:  Positive for malaise/fatigue. Negative for weight loss.   Eyes:  Negative for blurred vision and double vision.   Respiratory:  Positive for cough.    Gastrointestinal:  Negative for blood in stool.   Neurological:  Positive for dizziness and weakness. Negative for sensory change, speech change, focal weakness and headaches.   Endo/Heme/Allergies:  Does not bruise/bleed easily.        Past Medical History:   Diagnosis Date    Chronic kidney disease, unspecified     Dilated cardiomyopathy     Essential (primary) hypertension     GERD (gastroesophageal reflux disease)     Mixed hyperlipidemia     KATELYN (obstructive sleep apnea)     Prostate cancer     Type 2 diabetes mellitus without complications      Past Surgical History:   Procedure Laterality Date    TRANSCATHETER AORTIC VALVE REPLACEMENT (TAVR) N/A 7/22/2024    Procedure: REPLACEMENT, AORTIC VALVE, TRANSCATHETER (TAVR);  Surgeon: Darin Rahman MD;  Location: [facility] CATH LAB;  Service: Cardiology;  Laterality: N/A;     No family history on file.  Review of patient's allergies indicates:   Allergen Reactions    Iodinated  contrast media Swelling     Swelling of hands, feet and glands    Shrimp Swelling and Other (See Comments)    Tomato Swelling, Hives and Rash    Atorvastatin      Other reaction(s): Other (See Comments), Unknown    Contrast media     Iodine      Other reaction(s): Other (See Comments)    Lovastatin      Other reaction(s): Other (See Comments), Unknown    Simvastatin      Other reaction(s): Other (See Comments), Unknown    Mayonnaise Rash    [place] Rash       Current Outpatient Medications:     albuterol (PROVENTIL/VENTOLIN HFA) 90 mcg/actuation inhaler, Inhale 2 puffs into the lungs every 4 (four) hours as needed., Disp: , Rfl:     amiodarone (PACERONE) 200 MG Tab, Take 1 tablet (200 mg total) by mouth 2 (two) times daily., Disp: 60 tablet, Rfl: 11    apixaban (ELIQUIS) 5 mg Tab, Take 1 tablet (5 mg total) by mouth 2 (two) times daily., Disp: 60 tablet, Rfl: 11    aspirin (ECOTRIN) 81 MG EC tablet, Take 81 mg by mouth once daily., Disp: , Rfl:     azelastine (ASTELIN) 137 mcg (0.1 %) nasal spray, 2 sprays by Nasal route 2 (two) times daily., Disp: , Rfl:     cetirizine (ZYRTEC) 10 MG tablet, Take 10 mg by mouth once daily., Disp: , Rfl:     cholecalciferol, vitamin D3, (VITAMIN D3) 25 mcg (1,000 unit) capsule, Take 1,000 Units by mouth once daily., Disp: , Rfl:     clopidogreL (PLAVIX) 75 mg tablet, Take 75 mg by mouth once daily., Disp: , Rfl:     digoxin (LANOXIN) 125 mcg tablet, Take 1 tablet (0.125 mg total) by mouth once daily., Disp: 30 tablet, Rfl: 11    empagliflozin (JARDIANCE) 10 mg tablet, Take 10 mg by mouth once daily., Disp: , Rfl:     ezetimibe (ZETIA) 10 mg tablet, Take 10 mg by mouth once daily., Disp: , Rfl:     fluticasone propionate (FLONASE) 50 mcg/actuation nasal spray, 2 sprays by Nasal route daily as needed., Disp: , Rfl:     furosemide (LASIX) 20 MG tablet, Take 20 mg by mouth once daily., Disp: , Rfl:     glipiZIDE (GLUCOTROL) 10 MG tablet, Take 10 mg by mouth once daily., Disp: , Rfl:      guaiFENesin (MUCINEX) 600 mg 12 hr tablet, Take 1,200 mg by mouth 2 (two) times daily., Disp: , Rfl:     losartan (COZAAR) 25 MG tablet, Take 0.5 tablets (12.5 mg total) by mouth once daily., Disp: 15 tablet, Rfl: 11    metoprolol succinate (TOPROL-XL) 50 MG 24 hr tablet, Take 1 tablet (50 mg total) by mouth once daily., Disp: 30 tablet, Rfl: 11    rosuvastatin (CRESTOR) 40 MG Tab, Take 40 mg by mouth once daily., Disp: , Rfl:     sodium chloride (OCEAN) 0.65 % nasal spray, 1 spray by Nasal route as needed for Congestion., Disp: , Rfl:   Outpatient Medications Marked as Taking for the 10/17/24 encounter (Office Visit) with Indio Aguilar MD   Medication Sig Dispense Refill    albuterol (PROVENTIL/VENTOLIN HFA) 90 mcg/actuation inhaler Inhale 2 puffs into the lungs every 4 (four) hours as needed.      amiodarone (PACERONE) 200 MG Tab Take 1 tablet (200 mg total) by mouth 2 (two) times daily. 60 tablet 11    apixaban (ELIQUIS) 5 mg Tab Take 1 tablet (5 mg total) by mouth 2 (two) times daily. 60 tablet 11    aspirin (ECOTRIN) 81 MG EC tablet Take 81 mg by mouth once daily.      azelastine (ASTELIN) 137 mcg (0.1 %) nasal spray 2 sprays by Nasal route 2 (two) times daily.      cetirizine (ZYRTEC) 10 MG tablet Take 10 mg by mouth once daily.      cholecalciferol, vitamin D3, (VITAMIN D3) 25 mcg (1,000 unit) capsule Take 1,000 Units by mouth once daily.      clopidogreL (PLAVIX) 75 mg tablet Take 75 mg by mouth once daily.      digoxin (LANOXIN) 125 mcg tablet Take 1 tablet (0.125 mg total) by mouth once daily. 30 tablet 11    empagliflozin (JARDIANCE) 10 mg tablet Take 10 mg by mouth once daily.      ezetimibe (ZETIA) 10 mg tablet Take 10 mg by mouth once daily.      fluticasone propionate (FLONASE) 50 mcg/actuation nasal spray 2 sprays by Nasal route daily as needed.      furosemide (LASIX) 20 MG tablet Take 20 mg by mouth once daily.      glipiZIDE (GLUCOTROL) 10 MG tablet Take 10 mg by mouth once daily.       guaiFENesin (MUCINEX) 600 mg 12 hr tablet Take 1,200 mg by mouth 2 (two) times daily.      losartan (COZAAR) 25 MG tablet Take 0.5 tablets (12.5 mg total) by mouth once daily. 15 tablet 11    metoprolol succinate (TOPROL-XL) 50 MG 24 hr tablet Take 1 tablet (50 mg total) by mouth once daily. 30 tablet 11    rosuvastatin (CRESTOR) 40 MG Tab Take 40 mg by mouth once daily.      sodium chloride (OCEAN) 0.65 % nasal spray 1 spray by Nasal route as needed for Congestion.       Social History     Tobacco Use    Smoking status: Never    Smokeless tobacco: Never         Vitals:    10/17/24 0931   BP: (!) 115/58   Pulse: 64       Physical Exam:  HEENT NC/AT  CV RRR, no tenderness  Resp clear without dyspnea  GI soft  Ext no edema    Neuro:  Alert & oriented x 3  EOMI, PERRLA, visual field intact in all 4 quadrants, no nystagmus or diplopia on lateral gaze  Face symmetric, speech clear and fluent, facial sensation equal bilaterally  Tongue midline  Strength 5/5 in bilateral upper and lower extremities   Sensation intact and equal bilaterally  Gait steady and balanced using a walker     mRS 2    Imaging:  Reviewed  MRI Multiple areas of diffusion restriction - punctate lesion left cerebellum, moderate area left midbrain/cerebral peduncle, punctate right occipital, small area left corona radiata, punctate right frontal lobe, punctate posterior left frontal lobe.    Echo EF 40-45%. No thrombus. CUS Less than 50% stenosis.  Assessment:   Cole Oakley is a 73 y.o. male who is [place] by [facility] due [place]. He had a TAVR in July following which he had an embolic stroke. MRI had showed multifocal infarcts in bilateral hemispheres. He has a H/o HTN, HLD, DM2, CAD, CHF, KATELYN, A.fib and on eliquis currently.    I discussed with the patient and his wife that his stroke was likely iatrogenic from the TAVR procedure which is a known complication of the procedure. He is medically optimized for stroke  prevention with eliquis. I explained that he will continue to make recovery from the stroke and will need to continue rehab process to get back to his previous baseline. I also educated them about stroke symptoms and avoiding injuries since he is on eliquis and at risk of bleeding.     Plan:   - continue eliquis and crestor for stroke prevention  - Based on AHA/ACC 2021 guidelines, patient's primary care doctor should target BP goal of <130/80 mm?Hg, LDL-C <70 mg/dL and HbA1c of <7% to minimize the risk of future strokes.  - follow up as needed    I spent a total of 45 minutes on the day of the visit.This includes face to face time and non-face to face time preparing to see the patient (eg, review of tests), obtaining and/or reviewing separately obtained history, documenting clinical information in the electronic or other health record, independently interpreting results and communicating results to the patient/family/caregiver, or care coordinator.      Indio Aguilar MD  Vascular and Interventional Neurology

## 2025-01-13 DIAGNOSIS — C84.A0 CUTANEOUS T-CELL LYMPHOMA, UNSPECIFIED, UNSPECIFIED SITE: Primary | ICD-10-CM

## 2025-01-13 DIAGNOSIS — C84.A0 CUTANEOUS T-CELL LYMPHOMA: Primary | ICD-10-CM

## 2025-02-20 ENCOUNTER — TELEPHONE (OUTPATIENT)
Dept: SURGICAL ONCOLOGY | Facility: CLINIC | Age: 75
End: 2025-02-20
Payer: OTHER GOVERNMENT

## 2025-03-14 ENCOUNTER — TELEPHONE (OUTPATIENT)
Dept: HEMATOLOGY/ONCOLOGY | Facility: CLINIC | Age: 75
End: 2025-03-14

## 2025-03-14 ENCOUNTER — OFFICE VISIT (OUTPATIENT)
Dept: HEMATOLOGY/ONCOLOGY | Facility: CLINIC | Age: 75
End: 2025-03-14
Payer: OTHER GOVERNMENT

## 2025-03-14 VITALS
TEMPERATURE: 98 F | HEIGHT: 65 IN | WEIGHT: 155.31 LBS | DIASTOLIC BLOOD PRESSURE: 69 MMHG | OXYGEN SATURATION: 97 % | BODY MASS INDEX: 25.88 KG/M2 | SYSTOLIC BLOOD PRESSURE: 115 MMHG | HEART RATE: 75 BPM | RESPIRATION RATE: 14 BRPM

## 2025-03-14 DIAGNOSIS — C84.A0 CUTANEOUS T-CELL LYMPHOMA: ICD-10-CM

## 2025-03-14 DIAGNOSIS — C84.A2: Primary | ICD-10-CM

## 2025-03-14 RX ORDER — PANTOPRAZOLE SODIUM 40 MG/1
40 TABLET, DELAYED RELEASE ORAL DAILY
COMMUNITY

## 2025-03-14 RX ORDER — CLOPIDOGREL BISULFATE 75 MG/1
75 TABLET ORAL DAILY
COMMUNITY

## 2025-03-14 RX ORDER — TRAZODONE HYDROCHLORIDE 100 MG/1
50 TABLET ORAL NIGHTLY PRN
COMMUNITY
Start: 2024-12-30

## 2025-03-14 RX ORDER — LEVOTHYROXINE SODIUM 50 UG/1
50 TABLET ORAL
COMMUNITY
Start: 2025-01-31

## 2025-03-14 NOTE — PROGRESS NOTES
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  Patient was started on Bexarotene with the cutaneous T-cell lymphoma clinic and had a PET-CT done which revealed multiple hypermetabolic enlarged lymph nodes along with lung lesions concerning for visceral involvement.  Unfortunately prior to further follow-up, patient was admitted to the hospital with pneumonia in December 2024 at [facility].  He was discharged from [facility] to a rehab facility where he was admitted for another 3 weeks.  He was discharged last week from the hospital.  He presented to clinic today to establish care on 03/14/2025.      Patient denies any smoking, or recreational drug use.  He reports prior recreational drug use in the past.  He reports occasional alcohol use.  He denies any family history of malignancy.  He lives with his wife, ECOG 0-1 prior to his symptoms in November 2024.  Patient reports losing 80 lb over the last 4-5 months prior to his initial visit with us.      Interval history:    Today, 03/14/2025, patient reports symptoms of persistent shortness of breath leading supplemental oxygen via nasal cannula at 3 liters/minute.  He denies any fevers, chills, new lumps or bumps, decreased appetite.  He reports needing to use a wheelchair given lack of strength and difficulty with ambulation.  He denies any focal weakness.    Pathology      12/12/2024 stomach biopsy negative for malignancy.    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  CD4> CD8.  Monoclonal TCR beta and gamma.    Imaging       12/16/2024 PET-CT:  Interval development of multifocal peripheral peribronchial consolidative opacities within the lingula and left more than right lung bases, which demonstrate hypermetabolic activity.  Multiple enlarged/prominent mediastinal hilar axillary and inguinal lymph nodes with mild hypermetabolic activity which may be reactive secondary possible infectious pulmonary process as above versus a neoplastic process in the setting of patient's known malignancy FDG avid focal lesions corresponding to cutaneous soft tissue of the right lower abdominal wall without a definitive CT correlate, possibly urinary contamination versus cutaneous involvement.  Mildly increased focal FDG activity corresponding to aortic valve prostheses.  Slight nodular thickening of the left adrenal gland with mild hypermetabolic activity.  Asymmetric increased radiotracer activity surrounding the right hip arthroplasty, nonspecific.  Nonspecific radiotracer activity corresponding to the cutaneous soft tissues of the nose, recommend correlation with physical exam.    Past Medical History:   Diagnosis Date    Chronic kidney disease, unspecified     Dilated cardiomyopathy     Essential (primary) hypertension     GERD (gastroesophageal reflux disease)     Mixed hyperlipidemia     KATELYN (obstructive sleep apnea)     Prostate cancer     Type 2 diabetes mellitus without complications        Past Surgical History:   Procedure Laterality Date    TRANSCATHETER AORTIC VALVE REPLACEMENT (TAVR) N/A 7/22/2024    Procedure: REPLACEMENT, AORTIC VALVE, TRANSCATHETER (TAVR);  Surgeon: Darin Rahman MD;  Location: [facility] CATH LAB;  Service: Cardiology;  Laterality: N/A;       Family History   Problem Relation Name Age of Onset    Heart disease Mother      Heart disease Father         Social History     Socioeconomic History    Marital status:     Tobacco Use    Smoking status: Never    Smokeless tobacco: Never   Substance and Sexual Activity    Alcohol use: Yes    Drug use: Never     Social Drivers of Health     Financial Resource Strain: Low Risk  (7/19/2024)    Overall Financial Resource Strain (CARDIA)     Difficulty of Paying Living Expenses: Not hard at all   Food Insecurity: No Food Insecurity (7/19/2024)    Hunger Vital Sign     Worried About Running Out of Food in the Last Year: Never true     Ran Out of Food in the Last Year: Never true   Transportation Needs: No Transportation Needs (7/19/2024)    TRANSPORTATION NEEDS     Transportation : No   Stress: No Stress Concern Present (7/19/2024)    [employer] - Occupational Stress Questionnaire     Feeling of Stress : Not at all   Housing Stability: Unknown (7/19/2024)    Housing Stability Vital Sign     Unable to Pay for Housing in the Last Year: No     Homeless in the Last Year: No       Current Medications[1]    Review of patient's allergies indicates:   Allergen Reactions    Iodinated contrast media Swelling     Swelling of hands, feet and glands    Shrimp Swelling and Other (See Comments)    Tomato Swelling, Hives and Rash    Atorvastatin      Other reaction(s): Other (See Comments), Unknown    Contrast media     Iodine      Other reaction(s): Other (See Comments)    Lovastatin      Other reaction(s): Other (See Comments), Unknown    Simvastatin      Other reaction(s): Other (See Comments), Unknown    Mayonnaise Rash    [place] Rash         Review of systems  CONSTITUTIONAL: no fevers, no chills, no weight loss, no fatigue, no weakness  HEMATOLOGIC: no abnormal bleeding, no abnormal bruising, no drenching night sweats  ONCOLOGIC: no new masses or lumps  HEENT: no vision loss, no tinnitus or hearing loss, no nose bleeding, no dysphagia, no odynophagia  CVS: no chest pain, no palpitations, no dyspnea on exertion  RESP: no shortness of breath, no hemoptysis, no  cough  GI: no nausea, no vomiting, no diarrhea, no constipation, no melena, no hematochezia, no hematemesis, no abdominal pain, no increase in abdominal girth  : no dysuria, no hematuria, no hesitancy, no scrotal swelling, no discharge  INTEGUMENT: no rashes, no abnormal bruising, no nail pitting, no hyperpigmentation  NEURO: no falls, no memory loss, no paresthesias or dysesthesias, no urofecal incontinence or retention, no loss of strength on any extremity  MSK: no back pain, no new joint pain, no joint swelling  PSYCH: no suicidal or homicidal ideation, no depression, no insomnia, no anhedonia  ENDOCRINE: no heat or cold intolerance, no polyuria, no polydipsia      Physical Exam:  Vitals:    03/14/25 0904   BP: 115/69   Pulse: 75   Resp: 14   Temp: 98 °F (36.7 °C)       GA: AAOx3, NAD, in a wheelchair, accompanied by his wife  HEENT:  Extraocular movements intact, moist oral mucous membranes  LYMPH: no cervical, axillary or supraclavicular adenopathy  CVS: s1s2 RRR, no M/R/G  RESP:  Good bilateral air entry, no wheezing appreciated.  No accessory muscle use.  ABD: soft, NT, ND, BS+, no hepatosplenomegaly  EXT: no deformities, no pedal edema  SKIN:  Multiple hyperpigmented papular lesions over bilateral upper extremities, back, upper and lower chest as well as bilateral legs.  NEURO: normal mentation, strength 5/5 on all 4 extremities, no sensory deficits        Assessment    # Cutaneous T-cell lymphoma, stage indeterminate, pending staging studies and biopsies.    Reviewed imaging studies and pathology report   Discussed with patient the diagnosis of cutaneous T-cell lymphoma, the need for further workup including repeat PET scans as well as biopsy depending on PET scan report for further staging   Discussed treatment recommendations including possible need for systemic chemotherapy based on above imaging studies.  Patient has a significant decline in his functional status since December 2024 which will have to  be taken into account while planning his treatment regimens.  Patient and wife voiced understanding and agreed with this plan of care        Plan   CBC, CMP, LDH uric acid today  PET-CT   Surgery referral for possible excisional lymph node biopsy   Plan to follow-up in clinic in 10-14 days to discuss above workup and further treatment recommendations        A total of  60 minutes were spent in review of records, interpretation of test, coordination of care, discussion and counseling with the patient.        Portions of the record may have been created with voice recognition software. Occasional wrong-word or sound-a-like substitutions may have occurred due to the inherent limitations of voice recognition software.            [1]   Current Outpatient Medications   Medication Sig Dispense Refill    apixaban (ELIQUIS) 5 mg Tab Take 1 tablet (5 mg total) by mouth 2 (two) times daily. 60 tablet 11    clopidogreL (PLAVIX) 75 mg tablet Take 75 mg by mouth once daily.      empagliflozin (JARDIANCE) 10 mg tablet Take 10 mg by mouth once daily.      furosemide (LASIX) 20 MG tablet Take 20 mg by mouth once daily.      glipiZIDE (GLUCOTROL) 10 MG tablet Take 10 mg by mouth once daily.      levothyroxine (SYNTHROID) 50 MCG tablet Take 50 mcg by mouth before breakfast.      pantoprazole (PROTONIX) 40 MG tablet Take 40 mg by mouth once daily.      traZODone (DESYREL) 100 MG tablet Take 50 mg by mouth nightly as needed.      albuterol (PROVENTIL/VENTOLIN HFA) 90 mcg/actuation inhaler Inhale 2 puffs into the lungs every 4 (four) hours as needed. (Patient not taking: Reported on 3/14/2025)      amiodarone (PACERONE) 200 MG Tab Take 1 tablet (200 mg total) by mouth 2 (two) times daily. (Patient not taking: Reported on 3/14/2025) 60 tablet 11    azelastine (ASTELIN) 137 mcg (0.1 %) nasal spray 2 sprays by Nasal route 2 (two) times daily. (Patient not taking: Reported on 3/14/2025)      cetirizine (ZYRTEC) 10 MG tablet Take 10 mg by  mouth once daily. (Patient not taking: Reported on 3/14/2025)      cholecalciferol, vitamin D3, (VITAMIN D3) 25 mcg (1,000 unit) capsule Take 1,000 Units by mouth once daily. (Patient not taking: Reported on 3/14/2025)      digoxin (LANOXIN) 125 mcg tablet Take 1 tablet (0.125 mg total) by mouth once daily. (Patient not taking: Reported on 3/14/2025) 30 tablet 11    ezetimibe (ZETIA) 10 mg tablet Take 10 mg by mouth once daily. (Patient not taking: Reported on 3/14/2025)      fluticasone propionate (FLONASE) 50 mcg/actuation nasal spray 2 sprays by Nasal route daily as needed. (Patient not taking: Reported on 3/14/2025)      guaiFENesin (MUCINEX) 600 mg 12 hr tablet Take 1,200 mg by mouth 2 (two) times daily. (Patient not taking: Reported on 3/14/2025)      losartan (COZAAR) 25 MG tablet Take 0.5 tablets (12.5 mg total) by mouth once daily. (Patient not taking: Reported on 3/14/2025) 15 tablet 11    metoprolol succinate (TOPROL-XL) 50 MG 24 hr tablet Take 1 tablet (50 mg total) by mouth once daily. (Patient not taking: Reported on 3/14/2025) 30 tablet 11    rosuvastatin (CRESTOR) 40 MG Tab Take 40 mg by mouth once daily. (Patient not taking: Reported on 3/14/2025)      sodium chloride (OCEAN) 0.65 % nasal spray 1 spray by Nasal route as needed for Congestion. (Patient not taking: Reported on 3/14/2025)       No current facility-administered medications for this visit.

## 2025-03-14 NOTE — TELEPHONE ENCOUNTER
[facility] [place] concerning [facility] needing portable [facility] concentrator. States put in request for Pt.--SC, LPN

## 2025-03-26 ENCOUNTER — OFFICE VISIT (OUTPATIENT)
Dept: HEMATOLOGY/ONCOLOGY | Facility: CLINIC | Age: 75
End: 2025-03-26
Payer: OTHER GOVERNMENT

## 2025-03-26 VITALS
HEART RATE: 75 BPM | HEIGHT: 65 IN | TEMPERATURE: 98 F | BODY MASS INDEX: 25.39 KG/M2 | OXYGEN SATURATION: 97 % | RESPIRATION RATE: 14 BRPM | DIASTOLIC BLOOD PRESSURE: 63 MMHG | SYSTOLIC BLOOD PRESSURE: 105 MMHG | WEIGHT: 152.38 LBS

## 2025-03-26 DIAGNOSIS — C84.A0 CUTANEOUS T-CELL LYMPHOMA: ICD-10-CM

## 2025-03-26 PROCEDURE — 99214 OFFICE O/P EST MOD 30 MIN: CPT | Mod: ,,, | Performed by: STUDENT IN AN ORGANIZED HEALTH CARE EDUCATION/TRAINING PROGRAM

## 2025-03-26 NOTE — PROGRESS NOTES
Referring provider:  [facility]    Reason for consultation:  Cutaneous T-cell lymphoma      Diagnosis:  Cutaneous T-cell lymphoma, stage IB, [id number]      Current treatment:    Dates unknown: Bexarotene    Treatment history:      HPI:    Patient is a 74-year-old who has had a maculopapular hyperpigmented skin rashes for about 1 year, with gradual worsening which led to his consultation with Dermatology at the [facility] and biopsy in December 2024, pathology from which revealed cutaneous T-cell lymphoma.  Patient was started on Bexarotene with the cutaneous T-cell lymphoma clinic and had a PET-CT done which revealed multiple hypermetabolic enlarged lymph nodes along with lung lesions concerning for visceral involvement.  Unfortunately prior to further follow-up, patient was admitted to the hospital with pneumonia in [place] [facility]  [place] [facility] [place] rehab facility where he was admitted for another 3 weeks.  He was discharged last week from the hospital.  He presented to clinic today to establish care on 03/14/2025.      Patient denies any smoking, or recreational drug use.  He reports prior recreational drug use in the past.  He reports occasional alcohol use.  He denies any family history of malignancy.  He lives with his wife, ECOG 0-1 prior to his symptoms in November 2024.  Patient reports losing 80 lb over the last 4-5 months prior to his initial visit with us.      Interval history:    Today, 03/26/2025, patient reports symptoms of persistent shortness of breath requiring supplemental oxygen via nasal cannula.  He reports persistent itching of his skin.  He denies any fevers, chills, night sweats, decreased appetite or weight loss.    Pathology      12/12/2024 stomach biopsy negative for malignancy.    October 28, 2024 skin, right lower back: cutaneous T-cell lymphoma, CD2, CD5 positive, with partial loss of CD3 and CD4, CD7, CD8.  CD4> CD8.  Monoclonal TCR beta and  gamma.    Imaging     03/24/2025 PET-CT:  No PET evidence of metabolically active lymphoma.  Peripheral and lower lobe predominant patchy reticular and ground-glass opacities without prominent uptake, likely inflammatory in the acute setting versus pulmonary fibrosis in the chronic setting      12/16/2024 PET-CT:  Interval development of multifocal peripheral peribronchial consolidative opacities within the lingula and left more than right lung bases, which demonstrate hypermetabolic activity.  Multiple enlarged/prominent mediastinal hilar axillary and inguinal lymph nodes with mild hypermetabolic activity which may be reactive secondary possible infectious pulmonary process as above versus a neoplastic process in the setting of patient's known malignancy FDG avid focal lesions corresponding to cutaneous soft tissue of the right lower abdominal wall without a definitive CT correlate, possibly urinary contamination versus cutaneous involvement.  Mildly increased focal FDG activity corresponding to aortic valve prostheses.  Slight nodular thickening of the left adrenal gland with mild hypermetabolic activity.  Asymmetric increased radiotracer activity surrounding the right hip arthroplasty, nonspecific.  Nonspecific radiotracer activity corresponding to the cutaneous soft tissues of the nose, recommend correlation with physical exam.      Laboratory   03/14/2025:  Creatinine 1.16, LFTs unremarkable, LDH 3 2, uric acid 3.9, WBC count 9.2, hemoglobin 14.5, MCV 88.5, platelet count 470, ANC 3.9    Past Medical History:   Diagnosis Date    Chronic kidney disease, unspecified     Dilated cardiomyopathy     Essential (primary) hypertension     GERD (gastroesophageal reflux disease)     Mixed hyperlipidemia     KATELYN (obstructive sleep apnea)     Prostate cancer     Type 2 diabetes mellitus without complications        Past Surgical History:   Procedure Laterality Date    TRANSCATHETER AORTIC VALVE REPLACEMENT (TAVR) N/A  7/22/2024    Procedure: REPLACEMENT, AORTIC VALVE, TRANSCATHETER (TAVR);  Surgeon: Darin Rahman MD;  Location: [facility] CATH LAB;  Service: Cardiology;  Laterality: N/A;       Family History   Problem Relation Name Age of Onset    Heart disease Mother      Heart disease Father         Social History     Socioeconomic History    Marital status:    Tobacco Use    Smoking status: Never    Smokeless tobacco: Never   Substance and Sexual Activity    Alcohol use: Yes    Drug use: Never     Social Drivers of Health     Financial Resource Strain: Low Risk  (7/19/2024)    Overall Financial Resource Strain (CARDIA)     Difficulty of Paying Living Expenses: Not hard at all   Food Insecurity: No Food Insecurity (7/19/2024)    Hunger Vital Sign     Worried About Running Out of Food in the Last Year: Never true     Ran Out of Food in the Last Year: Never true   Transportation Needs: No Transportation Needs (7/19/2024)    TRANSPORTATION NEEDS     Transportation : No   Stress: No Stress Concern Present (7/19/2024)    [employer] - Occupational Stress Questionnaire     Feeling of Stress : Not at all   Housing Stability: Unknown (7/19/2024)    Housing Stability Vital Sign     Unable to Pay for Housing in the Last Year: No     Homeless in the Last Year: No       Current Medications[1]    Review of patient's allergies indicates:   Allergen Reactions    Iodinated contrast media Swelling     Swelling of hands, feet and glands    Shrimp Swelling and Other (See Comments)    Tomato Swelling, Hives and Rash    Atorvastatin      Other reaction(s): Other (See Comments), Unknown    Contrast media     Iodine      Other reaction(s): Other (See Comments)    Lovastatin      Other reaction(s): Other (See Comments), Unknown    Simvastatin      Other reaction(s): Other (See Comments), Unknown    Mayonnaise Rash    [place] Rash         Review of systems  CONSTITUTIONAL: no fevers, no chills, no weight loss, no  fatigue, no weakness  HEMATOLOGIC: no abnormal bleeding, no abnormal bruising, no drenching night sweats  ONCOLOGIC: no new masses or lumps  HEENT: no vision loss, no tinnitus or hearing loss, no nose bleeding, no dysphagia, no odynophagia  CVS: no chest pain, no palpitations, no dyspnea on exertion  RESP: no shortness of breath, no hemoptysis, no cough  GI: no nausea, no vomiting, no diarrhea, no constipation, no melena, no hematochezia, no hematemesis, no abdominal pain, no increase in abdominal girth  : no dysuria, no hematuria, no hesitancy, no scrotal swelling, no discharge  INTEGUMENT: no rashes, no abnormal bruising, no nail pitting, no hyperpigmentation  NEURO: no falls, no memory loss, no paresthesias or dysesthesias, no urofecal incontinence or retention, no loss of strength on any extremity  MSK: no back pain, no new joint pain, no joint swelling  PSYCH: no suicidal or homicidal ideation, no depression, no insomnia, no anhedonia  ENDOCRINE: no heat or cold intolerance, no polyuria, no polydipsia      Physical Exam:  Vitals:    03/26/25 1002   BP: 105/63   Pulse: 75   Resp: 14   Temp: 97.6 °F (36.4 °C)       GA: AAOx3, NAD, in a wheelchair, accompanied by his daughter  HEENT:  Extraocular movements intact, moist oral mucous membranes  LYMPH: no cervical, axillary or supraclavicular adenopathy  CVS: s1s2 RRR, no M/R/G  RESP:  Good bilateral air entry, no wheezing appreciated.  No accessory muscle use.  ABD: soft, NT, ND, BS+, no hepatosplenomegaly  EXT: no deformities, no pedal edema  SKIN:  Multiple hyperpigmented papular lesions over bilateral upper extremities, back, upper and lower chest as well as bilateral legs.  NEURO: normal mentation, strength 5/5 on all 4 extremities, no sensory deficits        Assessment    # Cutaneous T-cell lymphoma, probable stage I B, [id number]  Reviewed imaging studies and pathology report   Discussed with patient the diagnosis of cutaneous T-cell lymphoma, the need for  further workup including repeat PET scans as well as biopsy depending on PET scan report for further staging   Discussed treatment recommendations including possible need for systemic chemotherapy based on above imaging studies.  Patient has a significant decline in his functional status since December 2024 which will have to be taken into account while planning his treatment regimens.  Patient and wife voiced understanding and agreed with this plan of care  PET-CT in March 2025 with resolution of adenopathy seen previously.  No evidence of active hypermetabolic adenopathy concerning for lymphoma.  Discussed with patient the plan to continue current line of treatment with bexarotene as prescribed by his dermatologist.          Plan   Continue bexarotene as prescribed   Follow-up with cutaneous T-cell lymphoma clinic in [place] as scheduled   Plan to follow-up in our clinic in 2 months, labs prior.    Follow-up with Pulmonary Medicine as scheduled   Cancel surgery consultation for excisional biopsy given above PET-CT reports.      Portions of the record may have been created with voice recognition software. Occasional wrong-word or sound-a-like substitutions may have occurred due to the inherent limitations of voice recognition software.              [1]   Current Outpatient Medications   Medication Sig Dispense Refill    apixaban (ELIQUIS) 5 mg Tab Take 1 tablet (5 mg total) by mouth 2 (two) times daily. 60 tablet 11    clopidogreL (PLAVIX) 75 mg tablet Take 75 mg by mouth once daily.      empagliflozin (JARDIANCE) 10 mg tablet Take 10 mg by mouth once daily.      furosemide (LASIX) 20 MG tablet Take 20 mg by mouth once daily.      glipiZIDE (GLUCOTROL) 10 MG tablet Take 10 mg by mouth once daily.      levothyroxine (SYNTHROID) 50 MCG tablet Take 50 mcg by mouth before breakfast.      pantoprazole (PROTONIX) 40 MG tablet Take 40 mg by mouth once daily.      traZODone (DESYREL) 100 MG tablet Take 50 mg by mouth  nightly as needed.      albuterol (PROVENTIL/VENTOLIN HFA) 90 mcg/actuation inhaler Inhale 2 puffs into the lungs every 4 (four) hours as needed. (Patient not taking: Reported on 3/26/2025)      amiodarone (PACERONE) 200 MG Tab Take 1 tablet (200 mg total) by mouth 2 (two) times daily. (Patient not taking: Reported on 3/26/2025) 60 tablet 11    azelastine (ASTELIN) 137 mcg (0.1 %) nasal spray 2 sprays by Nasal route 2 (two) times daily. (Patient not taking: Reported on 3/26/2025)      cetirizine (ZYRTEC) 10 MG tablet Take 10 mg by mouth once daily. (Patient not taking: Reported on 3/26/2025)      cholecalciferol, vitamin D3, (VITAMIN D3) 25 mcg (1,000 unit) capsule Take 1,000 Units by mouth once daily. (Patient not taking: Reported on 3/26/2025)      digoxin (LANOXIN) 125 mcg tablet Take 1 tablet (0.125 mg total) by mouth once daily. (Patient not taking: Reported on 3/26/2025) 30 tablet 11    ezetimibe (ZETIA) 10 mg tablet Take 10 mg by mouth once daily. (Patient not taking: Reported on 3/26/2025)      fluticasone propionate (FLONASE) 50 mcg/actuation nasal spray 2 sprays by Nasal route daily as needed. (Patient not taking: Reported on 3/26/2025)      guaiFENesin (MUCINEX) 600 mg 12 hr tablet Take 1,200 mg by mouth 2 (two) times daily. (Patient not taking: Reported on 3/26/2025)      losartan (COZAAR) 25 MG tablet Take 0.5 tablets (12.5 mg total) by mouth once daily. (Patient not taking: Reported on 3/26/2025) 15 tablet 11    metoprolol succinate (TOPROL-XL) 50 MG 24 hr tablet Take 1 tablet (50 mg total) by mouth once daily. (Patient not taking: Reported on 3/26/2025) 30 tablet 11    rosuvastatin (CRESTOR) 40 MG Tab Take 40 mg by mouth once daily. (Patient not taking: Reported on 3/26/2025)      sodium chloride (OCEAN) 0.65 % nasal spray 1 spray by Nasal route as needed for Congestion. (Patient not taking: Reported on 3/26/2025)       No current facility-administered medications for this visit.

## 2025-06-03 ENCOUNTER — OFFICE VISIT (OUTPATIENT)
Dept: HEMATOLOGY/ONCOLOGY | Facility: CLINIC | Age: 75
End: 2025-06-03
Payer: OTHER GOVERNMENT

## 2025-06-03 VITALS
RESPIRATION RATE: 14 BRPM | TEMPERATURE: 98 F | HEIGHT: 65 IN | WEIGHT: 154.5 LBS | SYSTOLIC BLOOD PRESSURE: 114 MMHG | HEART RATE: 70 BPM | BODY MASS INDEX: 25.74 KG/M2 | OXYGEN SATURATION: 96 % | DIASTOLIC BLOOD PRESSURE: 66 MMHG

## 2025-06-03 DIAGNOSIS — C84.A0 CUTANEOUS T-CELL LYMPHOMA: ICD-10-CM

## 2025-06-03 PROCEDURE — 99214 OFFICE O/P EST MOD 30 MIN: CPT | Mod: ,,, | Performed by: STUDENT IN AN ORGANIZED HEALTH CARE EDUCATION/TRAINING PROGRAM

## (undated) DEVICE — SET ANGIO ACIST CVI ANGIOTOUCH

## (undated) DEVICE — GUIDEWIRE AMPLATZ STIFF 260X7

## (undated) DEVICE — CATH ELECTRODE BLLN 5FR 110CM

## (undated) DEVICE — SHEATH INTRODUCER 7FR 11CM

## (undated) DEVICE — SHEATH DESTINATION 6F X 45CM

## (undated) DEVICE — Device

## (undated) DEVICE — GUIDEWIRE STF .035X180CM STR

## (undated) DEVICE — CATH EMPULSE ANGLED 5FR PIGTAI

## (undated) DEVICE — SPONGE LAP XRAY DTECT 18X18IN

## (undated) DEVICE — SYS WASTE FLD DISPOSAL 1400ML

## (undated) DEVICE — BOWL UTILITY BLUE 32OZ

## (undated) DEVICE — GUIDEWIRE SAFARI2 XS CRV 275CM

## (undated) DEVICE — SHEATH INTRODUCER 6FR 11CM

## (undated) DEVICE — CATH IMPULSE 5F 100CM FR4

## (undated) DEVICE — SET MICPUNC ACC STIFF CANNULA

## (undated) DEVICE — GUIDEWIRE EMERALD 3MM 175X5CM

## (undated) DEVICE — SHEATH PINNACLE 8FR

## (undated) DEVICE — PAD DEFIB CADENCE ADULT R2

## (undated) DEVICE — CATH AL 1.0 5/BX

## (undated) DEVICE — CATH SWAN GANZ STND 7FR

## (undated) DEVICE — LOCATOR MANTA DEPTH 8FR